# Patient Record
Sex: FEMALE | Race: WHITE | Employment: OTHER | URBAN - METROPOLITAN AREA
[De-identification: names, ages, dates, MRNs, and addresses within clinical notes are randomized per-mention and may not be internally consistent; named-entity substitution may affect disease eponyms.]

---

## 2017-03-23 ENCOUNTER — PROCEDURE VISIT (OUTPATIENT)
Dept: CARDIOLOGY CLINIC | Age: 57
End: 2017-03-23

## 2017-03-23 ENCOUNTER — OFFICE VISIT (OUTPATIENT)
Dept: CARDIOLOGY CLINIC | Age: 57
End: 2017-03-23

## 2017-03-23 ENCOUNTER — HOSPITAL ENCOUNTER (OUTPATIENT)
Dept: CARDIOLOGY | Facility: CLINIC | Age: 57
Discharge: HOME | End: 2017-03-23
Attending: INTERNAL MEDICINE | Admitting: INTERNAL MEDICINE

## 2017-03-23 VITALS
DIASTOLIC BLOOD PRESSURE: 68 MMHG | SYSTOLIC BLOOD PRESSURE: 138 MMHG | HEIGHT: 65 IN | WEIGHT: 197.4 LBS | HEART RATE: 70 BPM | BODY MASS INDEX: 32.89 KG/M2

## 2017-03-23 DIAGNOSIS — I42.9 CARDIOMYOPATHY, IDIOPATHIC (HCC): ICD-10-CM

## 2017-03-23 DIAGNOSIS — R06.02 SOB (SHORTNESS OF BREATH): ICD-10-CM

## 2017-03-23 DIAGNOSIS — Z95.810 AUTOMATIC IMPLANTABLE CARDIOVERTER-DEFIBRILLATOR IN SITU: ICD-10-CM

## 2017-03-23 DIAGNOSIS — I50.22 SYSTOLIC CHF, CHRONIC (HCC): ICD-10-CM

## 2017-03-23 DIAGNOSIS — Z95.810 AUTOMATIC IMPLANTABLE CARDIOVERTER-DEFIBRILLATOR IN SITU: Primary | ICD-10-CM

## 2017-03-23 DIAGNOSIS — I50.22 SYSTOLIC CHF, CHRONIC (HCC): Primary | ICD-10-CM

## 2017-03-23 DIAGNOSIS — Z95.810 CARDIAC DEFIBRILLATOR IN PLACE: ICD-10-CM

## 2017-03-23 LAB
LV EF: 50 %
LVEF MODALITY: NORMAL

## 2017-03-23 PROCEDURE — 99214 OFFICE O/P EST MOD 30 MIN: CPT | Performed by: INTERNAL MEDICINE

## 2017-03-23 PROCEDURE — 93290 INTERROG DEV EVAL ICPMS IP: CPT | Performed by: INTERNAL MEDICINE

## 2017-03-23 PROCEDURE — 93284 PRGRMG EVAL IMPLANTABLE DFB: CPT | Performed by: INTERNAL MEDICINE

## 2018-01-29 ENCOUNTER — TELEPHONE (OUTPATIENT)
Dept: CARDIOLOGY CLINIC | Age: 58
End: 2018-01-29

## 2018-03-23 PROCEDURE — 93284 PRGRMG EVAL IMPLANTABLE DFB: CPT | Performed by: INTERNAL MEDICINE

## 2018-03-27 ENCOUNTER — PROCEDURE VISIT (OUTPATIENT)
Dept: CARDIOLOGY CLINIC | Age: 58
End: 2018-03-27

## 2018-03-27 ENCOUNTER — OFFICE VISIT (OUTPATIENT)
Dept: CARDIOLOGY CLINIC | Age: 58
End: 2018-03-27

## 2018-03-27 VITALS
DIASTOLIC BLOOD PRESSURE: 80 MMHG | WEIGHT: 219 LBS | SYSTOLIC BLOOD PRESSURE: 170 MMHG | HEIGHT: 65 IN | OXYGEN SATURATION: 95 % | BODY MASS INDEX: 36.49 KG/M2 | HEART RATE: 96 BPM

## 2018-03-27 DIAGNOSIS — Z95.810 AUTOMATIC IMPLANTABLE CARDIOVERTER-DEFIBRILLATOR IN SITU: Primary | ICD-10-CM

## 2018-03-27 DIAGNOSIS — R06.02 SOB (SHORTNESS OF BREATH): ICD-10-CM

## 2018-03-27 DIAGNOSIS — Z95.810 AUTOMATIC IMPLANTABLE CARDIOVERTER-DEFIBRILLATOR IN SITU: ICD-10-CM

## 2018-03-27 DIAGNOSIS — Z95.810 CARDIAC DEFIBRILLATOR IN PLACE: ICD-10-CM

## 2018-03-27 DIAGNOSIS — I42.9 CARDIOMYOPATHY, IDIOPATHIC (HCC): ICD-10-CM

## 2018-03-27 DIAGNOSIS — I50.22 SYSTOLIC CHF, CHRONIC (HCC): Primary | ICD-10-CM

## 2018-03-27 DIAGNOSIS — E78.00 PURE HYPERCHOLESTEROLEMIA: ICD-10-CM

## 2018-03-27 DIAGNOSIS — E55.9 VITAMIN D DEFICIENCY: ICD-10-CM

## 2018-03-27 DIAGNOSIS — I10 ESSENTIAL HYPERTENSION: ICD-10-CM

## 2018-03-27 DIAGNOSIS — I50.22 SYSTOLIC CHF, CHRONIC (HCC): ICD-10-CM

## 2018-03-27 PROCEDURE — 99214 OFFICE O/P EST MOD 30 MIN: CPT | Performed by: INTERNAL MEDICINE

## 2018-03-27 PROCEDURE — 93290 INTERROG DEV EVAL ICPMS IP: CPT | Performed by: INTERNAL MEDICINE

## 2018-03-27 RX ORDER — PRAVASTATIN SODIUM 40 MG
40 TABLET ORAL DAILY
COMMUNITY
End: 2018-04-27

## 2018-03-27 RX ORDER — VALSARTAN 160 MG/1
160 TABLET ORAL DAILY
Qty: 30 TABLET | Refills: 3 | Status: SHIPPED | OUTPATIENT
Start: 2018-03-27 | End: 2018-06-06 | Stop reason: SDUPTHER

## 2018-03-27 NOTE — LETTER
  SECTION      x3    COLONOSCOPY      ENDOSCOPY, COLON, DIAGNOSTIC      KNEE SURGERY  10/2011    Left Maniscus    NECK SURGERY      FUSION C5/6 WITH CADAVAR BONES 2002    PACEMAKER INSERTION      medtronic BiV AICD, with optivol    TONSILLECTOMY       Family History   Problem Relation Age of Onset    Kidney Disease Mother     Stroke Father     High Blood Pressure Father     Heart Disease Father     High Blood Pressure Brother     Diabetes Brother      Social History     Social History    Marital status:      Spouse name: N/A    Number of children: N/A    Years of education: N/A     Occupational History    Not on file. Social History Main Topics    Smoking status: Former Smoker     Packs/day: 0.50     Years: 30.00     Quit date: 2010    Smokeless tobacco: Never Used    Alcohol use No    Drug use: No    Sexual activity: Not on file     Other Topics Concern    Not on file     Social History Narrative    No narrative on file       Review of Systems:   · Constitutional: there has been no unanticipated weight loss. There's been no change in energy level, sleep pattern, or activity level. · Eyes: No visual changes or diplopia. No scleral icterus. · ENT: No Headaches, hearing loss or vertigo. No mouth sores or sore throat. · Cardiovascular: No chest pain or palpitations but worsening edema. · Respiratory: SOL worsening   · Gastrointestinal: No abdominal pain, appetite loss, blood in stools. No change in bowel or bladder habits. · Genitourinary: No dysuria, trouble voiding, or hematuria. · Musculoskeletal:  No gait disturbance, weakness or joint complaints. · Integumentary: No rash or pruritis. · Neurological: No headache, diplopia, change in muscle strength, numbness or tingling. No change in gait, balance, coordination, mood, affect, memory, mentation, behavior. · Psychiatric: No anxiety, no depression. · Endocrine: No malaise, fatigue or temperature intolerance. No excessive thirst, fluid intake, or urination. No tremor. · Hematologic/Lymphatic: No abnormal bruising or bleeding, blood clots or swollen lymph nodes. · Allergic/Immunologic: No nasal congestion or hives. Physical Examination:    Vitals:    03/27/18 1502 03/27/18 1507   BP: (!) 170/90 (!) 170/80   Pulse: 96    SpO2: 95%    Weight: 219 lb (99.3 kg)    Height: 5' 5\" (1.651 m)      Wt Readings from Last 3 Encounters:   03/27/18 219 lb (99.3 kg)   03/23/17 197 lb 6.4 oz (89.5 kg)   06/07/16 184 lb (83.5 kg)     BP Readings from Last 3 Encounters:   03/27/18 (!) 170/80   03/23/17 138/68   06/07/16 138/70       Constitutional and General Appearance:   WD/WN in NAD, pale appearing  HEENT:  NC/AT  Respiratory:  · Normal excursion and expansion without use of accessory muscles  · Resp Auscultation: Normal breath sounds without dullness  Cardiovascular:  · The apical impulses not displaced  · Heart tones are crisp and normal  · Cervical veins are not engorged  · The carotid upstroke is normal in amplitude and contour without delay or bruit  · JVP 9-10 cm H2O  RRR with nl S1 and S2 without m,r,g  · Peripheral pulses are symmetrical and full  · There is no clubbing, cyanosis of the extremities. · Pitting 2+ edema bilaterally from knees down.     · Femoral Arteries: 2+ and equal  · Pedal Pulses: 2+ and equal   Abdomen:  · No masses or tenderness  · Liver/Spleen: No Abnormalities Noted  Neurological/Psychiatric:  · Alert and oriented in all spheres  · Moves all extremities well  · Exhibits normal gait balance and coordination  · No abnormalities of mood, affect, memory, mentation, or behavior are noted  ·   Lab Results   Component Value Date     12/03/2014     09/09/2011     09/08/2011    K 4.0 12/03/2014    K 4.1 09/09/2011    K 3.7 09/08/2011    BUN 13 12/03/2014    BUN 41 09/09/2011    BUN 26 09/08/2011    CREATININE 1.1 12/03/2014 CREATININE 1.5 09/09/2011    CREATININE 1.2 09/08/2011    GLUCOSE 96 12/03/2014    GLUCOSE 90 09/09/2011     Lab Results   Component Value Date    BNP 37 09/07/2011    BNP 39 05/31/2011    BNP 37 10/19/2010     Lab Results   Component Value Date    ALT 21 12/03/2014    ALT 14 09/07/2011    AST 19 12/03/2014    AST 18 09/07/2011     Lab Results   Component Value Date    HGB 14.5 12/03/2014    HGB 12.1 02/12/2014    HCT 43.2 12/03/2014    HCT 35.1 02/12/2014     12/03/2014    PLT see below 02/12/2014     Lab Results   Component Value Date    TRIG 119 12/03/2014    TRIG 111 05/31/2011    HDL 69 12/03/2014    HDL 59 05/31/2011    HDL 53 02/08/2010    LDLCALC 84 12/03/2014    LDLCALC 146 05/31/2011      ECHO 3/23/17  Summary   Low normal left ventricle systolic function with an estimated ejection   fraction of 50%. EF by Cornejo's method is 51%. There is hypokinesis of the inferoseptal and apical inferior walls. Grade I diastolic dysfunction with normal filing pressure. Mild mitral regurgitation. Mild to moderate aortic regurgitation. Systolic pulmonary artery pressure (SPAP) is normal and estimated at 20 mmHg   (RA pressure 3 mmHg). Assessment/Plan:  Doing well from a CV standpoint   We made no change in her Tx   1. HYPERTENSION - controlled   BP (!) 170/80   Pulse 96   Ht 5' 5\" (1.651 m)   Wt 219 lb (99.3 kg)   SpO2 95%   BMI 36.44 kg/m²       2. Hyperlipidemia - will order lipids for evaluation  11/14 TC= 177; TG= 119;HDL= 69; LDL= 84   3. Cardiomyopathy 1/14 Summary  Low normal LV systolic function with EF of 50-55%  Abnormal (paradoxical) septal motion consistent with RV pacemaker  Reversal of E/A inflow velocities across the mitral valve suggesting  impaired left ventricular relaxation. Mild mitral and tricuspid regurgitation with RVSP estimated at 30 mmHg. Moderate aortic regurgitation is present. Pacer lead noted in right heart. 6/09: EF 25-30%, echo 11/10: EF 55%, 6/09 medtronic bi-v AICD, EF 50% from ECHO    4. CHF (congestive heart failure) - compensated today    5. Palpitations - no complaints today      PLAN  1. LABS - cbc, cmp, bnp, tsh, Ft4, Vit D, lipid  2. Echocardiogram  3. Chest xray  4. Valsartan 160 mg daily due to CHF and hypertension  5. Follow up in 4-6 weeks  6. Testing will determine referral to pulmonology      QUALITY MEASURES  1. Tobacco Cessation Counseling:  N/A (quit 2010)  2. Retake of BP if >140/90:   N/A  3. CAD patient on anti-platelet: N/A    4. CAD patient on STATIN therapy:  N/A (lipitor for HLD)  5. Patient with CHF and aFib on anticoagulation:  N/A       I appreciate the opportunity of cooperating in the care of this individual.    Annabella Krishnan M.D., [facility] [place]                            If you have questions, please do not hesitate to call me. I look forward to following Bonifacio Huerta along with you.     Sincerely,        Aure Cool MD

## 2018-03-27 NOTE — PROGRESS NOTES
[address]   Advanced Heart Failure/Pulmonary Hypertension  Cardiac Evaluation      Lyla Askew  YOB: 1960    Date of Visit:  3/27/18        Chief Complaint   Patient presents with    1 Year Follow Up    Cough    Shortness of Breath    Sweats     profuse        History of Present Illness:  Ms Christa Yen comes to the office today for follow up of her nonischemic cardiomyopathy with LBBB. She had a RHC 1/7/09 which showed normal coronaries and normal cardiac output. She was told years ago that her EF was 20-25%. 8/08 she had an angiogram that showed normal coronaries and EF 30%. However, MUGA scan revealed EF 61%. June, 2009 she had placement of bi-v defibrillator, and has had no shocks. Echo 10/09 showed EF 40-45% and decrease in left ventricular size. She had an echo today that showed EF 50%. She has been granted disability for her heart and has been out of work. As part of her history was told she has a slight underactive thyroid. Her echo from 3/23/17 shows an EF of 50-55%. Her device check from 3/23/17 shows normal device sensing and function with normal fluid level on her Optivol. Today she reports she has an ongoing dry cough. This started at the end of last summer. She states that her voice gets hoarse. She feels like a tight band is around her chest.  She states she has been very SOB. This happens with exertion. She breaks out in a sweat. She is not taking the lisinopril. She is taking all her other medications as prescribed. She is taking the lasix 3-4 times a week, but it does not help with SOB. She lost her mother to lung cancer in September 2017. She denies chest pain, dizziness or syncope. She has tried taking Prilosec and this has not improved.         Allergies   Allergen Reactions    Tetracyclines & Related Nausea And Vomiting    Lisinopril      Flu like symptoms     Current Outpatient Prescriptions   Medication Sig Dispense Refill    pravastatin (PRAVACHOL) 40 MG tablet Take 40 mg by mouth daily      furosemide (LASIX) 20 MG tablet Take 1 tablet by mouth as needed 90 tablet 3    carvedilol (COREG) 25 MG tablet Take 1 tablet by mouth 2 times daily (with meals). 180 tablet 4    levothyroxine (SYNTHROID) 25 MCG tablet Take 25 mcg by mouth Daily.  atorvastatin (LIPITOR) 20 MG tablet TAKE ONE TABLET BY MOUTH DAILY 30 tablet 5    lisinopril (PRINIVIL;ZESTRIL) 20 MG tablet Take 1 tablet by mouth daily 90 tablet 3     No current facility-administered medications for this visit.         Immunization History   Administered Date(s) Administered    Pneumococcal Polysaccharide ([id number]) 2011       Patient Active Problem List   Diagnosis    Hypertension    Hyperlipidemia    Palpitations    Systolic CHF, chronic ([address]    Cardiomyopathy, idiopathic (HCC)    Automatic implantable cardioverter-defibrillator in situ    SOB (shortness of breath)       Past Medical History:   Diagnosis Date    BIPOLAR     Cardiomyopathy ([address]     UNKNOWN CAUSE    CHF (congestive heart failure) (HCC)     Fibromyalgia     GERD (gastroesophageal reflux disease)     Hyperlipidemia     Hypertension     Palpitations     Plantar fasciitis left lower extremity    TORN MENISCUS BOTH KNEES      Past Surgical History:   Procedure Laterality Date    BREAST ENHANCEMENT SURGERY       SECTION      x3    COLONOSCOPY      ENDOSCOPY, COLON, DIAGNOSTIC      KNEE SURGERY  10/2011    Left Maniscus    NECK SURGERY      FUSION C5/6 WITH CADAVAR BONES     PACEMAKER INSERTION      medtronic BiV AICD, with optivol    TONSILLECTOMY       Family History   Problem Relation Age of Onset    Kidney Disease Mother     Stroke Father     High Blood Pressure Father     Heart Disease Father     High Blood Pressure Brother     Diabetes Brother      Social History     Social History    Marital status:      Spouse name: N/A    Number of children: N/A    Years of [address] 12/03/2014    LDLCALC 146 05/31/2011      ECHO 3/23/17  Summary   Low normal left ventricle systolic function with an estimated ejection   fraction of 50%. EF by Cornejo's method is 51%. There is hypokinesis of the inferoseptal and apical inferior walls. Grade I diastolic dysfunction with normal filing pressure. Mild mitral regurgitation. Mild to moderate aortic regurgitation. Systolic pulmonary artery pressure (SPAP) is normal and estimated at 20 mmHg   (RA pressure 3 mmHg). Assessment/Plan:  Doing well from a CV standpoint   We made no change in her Tx   1. HYPERTENSION - controlled   BP (!) 170/80   Pulse 96   Ht 5' 5\" (1.651 m)   Wt 219 lb (99.3 kg)   SpO2 95%   BMI 36.44 kg/m²      2. Hyperlipidemia - will order lipids for evaluation  11/14 TC= 177; TG= 119;HDL= 69; LDL= 84   3. Cardiomyopathy 1/14 Summary  Low normal LV systolic function with EF of 50-55%  Abnormal (paradoxical) septal motion consistent with RV pacemaker  Reversal of E/A inflow velocities across the mitral valve suggesting  impaired left ventricular relaxation. Mild mitral and tricuspid regurgitation with RVSP estimated at 30 mmHg. Moderate aortic regurgitation is present. Pacer lead noted in right heart. 6/09: EF 25-30%, echo 11/10: EF 55%, 6/09 medtronic bi-v AICD, EF 50% from ECHO    4. CHF (congestive heart failure) - compensated today    5. Palpitations - no complaints today      PLAN  1. LABS - cbc, cmp, bnp, tsh, Ft4, Vit D, lipid  2. Echocardiogram  3. Chest xray  4. Valsartan 160 mg daily due to CHF and hypertension  5. Follow up in 4-6 weeks  6. Testing will determine referral to pulmonology      QUALITY MEASURES  1. Tobacco Cessation Counseling:  N/A (quit 2010)  2. Retake of BP if >140/90:   N/A  3. CAD patient on anti-platelet: N/A    4. CAD patient on STATIN therapy:  N/A (lipitor for HLD)  5.  Patient with CHF and aFib on anticoagulation:  N/A       I appreciate the opportunity of cooperating in the care of this individual.    Beckie Morales M.D., [facility]

## 2018-03-28 ENCOUNTER — HOSPITAL ENCOUNTER (OUTPATIENT)
Dept: OTHER | Age: 58
Discharge: HOME | End: 2018-03-28
Attending: INTERNAL MEDICINE | Admitting: INTERNAL MEDICINE

## 2018-03-28 ENCOUNTER — TELEPHONE (OUTPATIENT)
Dept: CARDIOLOGY CLINIC | Age: 58
End: 2018-03-28

## 2018-03-28 DIAGNOSIS — R06.02 SOB (SHORTNESS OF BREATH): ICD-10-CM

## 2018-03-28 LAB
A/G RATIO: 1.4 (ref 1.1–2.2)
ALBUMIN SERPL-MCNC: 4.1 G/DL (ref 3.4–5)
ALP BLD-CCNC: 72 U/L (ref 40–129)
ALT SERPL-CCNC: 36 U/L (ref 10–40)
ANION GAP SERPL CALCULATED.3IONS-SCNC: 11 MMOL/L (ref 3–16)
AST SERPL-CCNC: 32 U/L (ref 15–37)
BASOPHILS ABSOLUTE: 0 K/UL (ref 0–0.2)
BASOPHILS RELATIVE PERCENT: 0.4 %
BILIRUB SERPL-MCNC: 0.3 MG/DL (ref 0–1)
BUN BLDV-MCNC: 14 MG/DL (ref 7–20)
CALCIUM SERPL-MCNC: 9.5 MG/DL (ref 8.3–10.6)
CHLORIDE BLD-SCNC: 105 MMOL/L (ref 99–110)
CHOLESTEROL, FASTING: 189 MG/DL (ref 0–199)
CO2: 25 MMOL/L (ref 21–32)
CREAT SERPL-MCNC: 1 MG/DL (ref 0.6–1.1)
EOSINOPHILS ABSOLUTE: 0.2 K/UL (ref 0–0.6)
EOSINOPHILS RELATIVE PERCENT: 1.7 %
GFR AFRICAN AMERICAN: >60
GFR NON-AFRICAN AMERICAN: 57
GLOBULIN: 3 G/DL
GLUCOSE FASTING: 101 MG/DL (ref 70–99)
HCT VFR BLD CALC: 43.3 % (ref 36–48)
HDLC SERPL-MCNC: 53 MG/DL (ref 40–60)
HEMOGLOBIN: 14.6 G/DL (ref 12–16)
LDL CHOLESTEROL CALCULATED: 106 MG/DL
LYMPHOCYTES ABSOLUTE: 3.3 K/UL (ref 1–5.1)
LYMPHOCYTES RELATIVE PERCENT: 35.1 %
MCH RBC QN AUTO: 31.7 PG (ref 26–34)
MCHC RBC AUTO-ENTMCNC: 33.8 G/DL (ref 31–36)
MCV RBC AUTO: 93.8 FL (ref 80–100)
MONOCYTES ABSOLUTE: 0.8 K/UL (ref 0–1.3)
MONOCYTES RELATIVE PERCENT: 8.3 %
NEUTROPHILS ABSOLUTE: 5.1 K/UL (ref 1.7–7.7)
NEUTROPHILS RELATIVE PERCENT: 54.5 %
PDW BLD-RTO: 13.6 % (ref 12.4–15.4)
PLATELET # BLD: 241 K/UL (ref 135–450)
PMV BLD AUTO: 7.8 FL (ref 5–10.5)
POTASSIUM SERPL-SCNC: 4 MMOL/L (ref 3.5–5.1)
PRO-BNP: 91 PG/ML (ref 0–124)
RBC # BLD: 4.62 M/UL (ref 4–5.2)
SODIUM BLD-SCNC: 141 MMOL/L (ref 136–145)
T4 FREE: 1.2 NG/DL (ref 0.9–1.8)
TOTAL PROTEIN: 7.1 G/DL (ref 6.4–8.2)
TRIGLYCERIDE, FASTING: 150 MG/DL (ref 0–150)
TSH REFLEX: 6.35 UIU/ML (ref 0.27–4.2)
VLDLC SERPL CALC-MCNC: 30 MG/DL
WBC # BLD: 9.3 K/UL (ref 4–11)

## 2018-03-28 NOTE — TELEPHONE ENCOUNTER
----- Message from Rafal Renner MD sent at 3/28/2018  3:40 PM EDT -----  Please call patient and let her know that her labs look good. No change in treatment. Rafal Renner.

## 2018-03-29 ENCOUNTER — TELEPHONE (OUTPATIENT)
Dept: CARDIOLOGY CLINIC | Age: 58
End: 2018-03-29

## 2018-03-29 LAB — VITAMIN D 25-HYDROXY: 10.6 NG/ML

## 2018-03-29 RX ORDER — ERGOCALCIFEROL (VITAMIN D2) 1250 MCG
50000 CAPSULE ORAL WEEKLY
Qty: 4 CAPSULE | Refills: 5 | Status: SHIPPED | OUTPATIENT
Start: 2018-03-29 | End: 2018-09-10 | Stop reason: SDUPTHER

## 2018-03-29 NOTE — TELEPHONE ENCOUNTER
----- Message from Zoë Godfrey MD sent at 3/29/2018  4:31 PM EDT -----  Call pt. Labs show severely low vitamin D level. Start ergocalciferol 50,000 units weekly x 6 months.   MATEO

## 2018-04-05 NOTE — COMMUNICATION BODY
(PRAVACHOL) 40 MG tablet Take 40 mg by mouth daily      furosemide (LASIX) 20 MG tablet Take 1 tablet by mouth as needed 90 tablet 3    carvedilol (COREG) 25 MG tablet Take 1 tablet by mouth 2 times daily (with meals). 180 tablet 4    levothyroxine (SYNTHROID) 25 MCG tablet Take 25 mcg by mouth Daily.  atorvastatin (LIPITOR) 20 MG tablet TAKE ONE TABLET BY MOUTH DAILY 30 tablet 5    lisinopril (PRINIVIL;ZESTRIL) 20 MG tablet Take 1 tablet by mouth daily 90 tablet 3     No current facility-administered medications for this visit.         Immunization History   Administered Date(s) Administered    Pneumococcal Polysaccharide ([id number]) 2011       Patient Active Problem List   Diagnosis    Hypertension    Hyperlipidemia    Palpitations    Systolic CHF, chronic ([address]    Cardiomyopathy, idiopathic (HCC)    Automatic implantable cardioverter-defibrillator in situ    SOB (shortness of breath)       Past Medical History:   Diagnosis Date    BIPOLAR     Cardiomy[facility] ([facility]     UNKNOWN CAUSE    CHF (congestive heart failure) (HCC)     Fibromyalgia     GERD (gastroesophageal reflux disease)     Hyperlipidemia     Hypertension     Palpitations     Plantar fasciitis left lower extremity    TORN MENISCUS BOTH KNEES      Past Surgical History:   Procedure Laterality Date    BREAST ENHANCEMENT SURGERY       SECTION      x3    COLONOSCOPY      ENDOSCOPY, COLON, DIAGNOSTIC      KNEE SURGERY  10/2011    Left Maniscus    NECK SURGERY      FUSION C5/6 WITH CADAVAR BONES     PACEMAKER INSERTION      medtronic BiV AICD, with optivol    TONSILLECTOMY       Family History   Problem Relation Age of Onset    Kidney Disease Mother     Stroke Father     High Blood Pressure Father     Heart Disease Father     High Blood Pressure Brother     Diabetes Brother      Social History     Social History    Marital status:      Spouse name: N/A    Number of children: N/A    Years of [address] 84 12/03/2014    LDLCALC 146 05/31/2011      ECHO 3/23/17  Summary   Low normal left ventricle systolic function with an estimated ejection   fraction of 50%. EF by Cornejo's method is 51%. There is hypokinesis of the inferoseptal and apical inferior walls. Grade I diastolic dysfunction with normal filing pressure. Mild mitral regurgitation. Mild to moderate aortic regurgitation. Systolic pulmonary artery pressure (SPAP) is normal and estimated at 20 mmHg   (RA pressure 3 mmHg). Assessment/Plan:  Doing well from a CV standpoint   We made no change in her Tx   1. HYPERTENSION - controlled   BP (!) 170/80   Pulse 96   Ht 5' 5\" (1.651 m)   Wt 219 lb (99.3 kg)   SpO2 95%   BMI 36.44 kg/m²       2. Hyperlipidemia - will order lipids for evaluation  11/14 TC= 177; TG= 119;HDL= 69; LDL= 84   3. Cardiomyopathy 1/14 Summary  Low normal LV systolic function with EF of 50-55%  Abnormal (paradoxical) septal motion consistent with RV pacemaker  Reversal of E/A inflow velocities across the mitral valve suggesting  impaired left ventricular relaxation. Mild mitral and tricuspid regurgitation with RVSP estimated at 30 mmHg. Moderate aortic regurgitation is present. Pacer lead noted in right heart. 6/09: EF 25-30%, echo 11/10: EF 55%, 6/09 medtronic bi-v AICD, EF 50% from ECHO    4. CHF (congestive heart failure) - compensated today    5. Palpitations - no complaints today      PLAN  1. LABS - cbc, cmp, bnp, tsh, Ft4, Vit D, lipid  2. Echocardiogram  3. Chest xray  4. Valsartan 160 mg daily due to CHF and hypertension  5. Follow up in 4-6 weeks  6. Testing will determine referral to pulmonology      QUALITY MEASURES  1. Tobacco Cessation Counseling:  N/A (quit 2010)  2. Retake of BP if >140/90:   N/A  3. CAD patient on anti-platelet: N/A    4. CAD patient on STATIN therapy:  N/A (lipitor for HLD)  5.  Patient with CHF and aFib on anticoagulation:  N/A       I appreciate the opportunity

## 2018-04-13 ENCOUNTER — TELEPHONE (OUTPATIENT)
Dept: CARDIOLOGY CLINIC | Age: 58
End: 2018-04-13

## 2018-04-18 ENCOUNTER — HOSPITAL ENCOUNTER (OUTPATIENT)
Dept: CARDIOLOGY | Facility: CLINIC | Age: 58
Discharge: HOME | End: 2018-04-18
Attending: INTERNAL MEDICINE | Admitting: INTERNAL MEDICINE

## 2018-04-18 DIAGNOSIS — I50.22 CHRONIC SYSTOLIC CONGESTIVE HEART FAILURE (HCC): ICD-10-CM

## 2018-04-18 LAB
LV EF: 55 %
LVEF MODALITY: NORMAL

## 2018-04-26 ENCOUNTER — TELEPHONE (OUTPATIENT)
Dept: CARDIOLOGY CLINIC | Age: 58
End: 2018-04-26

## 2018-04-27 ENCOUNTER — OFFICE VISIT (OUTPATIENT)
Dept: CARDIOLOGY CLINIC | Age: 58
End: 2018-04-27

## 2018-04-27 VITALS
HEART RATE: 78 BPM | BODY MASS INDEX: 37.39 KG/M2 | SYSTOLIC BLOOD PRESSURE: 130 MMHG | DIASTOLIC BLOOD PRESSURE: 72 MMHG | OXYGEN SATURATION: 97 % | WEIGHT: 219 LBS | HEIGHT: 64 IN

## 2018-04-27 DIAGNOSIS — R06.02 SOB (SHORTNESS OF BREATH): ICD-10-CM

## 2018-04-27 DIAGNOSIS — E78.00 PURE HYPERCHOLESTEROLEMIA: ICD-10-CM

## 2018-04-27 DIAGNOSIS — I50.22 SYSTOLIC CHF, CHRONIC (HCC): Primary | ICD-10-CM

## 2018-04-27 DIAGNOSIS — E55.9 VITAMIN D INSUFFICIENCY: ICD-10-CM

## 2018-04-27 DIAGNOSIS — I10 ESSENTIAL HYPERTENSION: ICD-10-CM

## 2018-04-27 PROCEDURE — 99214 OFFICE O/P EST MOD 30 MIN: CPT | Performed by: INTERNAL MEDICINE

## 2018-04-27 RX ORDER — ATORVASTATIN CALCIUM 20 MG/1
TABLET, FILM COATED ORAL
Qty: 30 TABLET | Refills: 5 | Status: SHIPPED | OUTPATIENT
Start: 2018-04-27 | End: 2019-01-22 | Stop reason: SDUPTHER

## 2018-04-27 RX ORDER — CARVEDILOL 25 MG/1
25 TABLET ORAL 2 TIMES DAILY WITH MEALS
Qty: 180 TABLET | Refills: 4 | Status: SHIPPED | OUTPATIENT
Start: 2018-04-27 | End: 2020-11-24 | Stop reason: SDUPTHER

## 2018-06-06 RX ORDER — VALSARTAN 160 MG/1
TABLET ORAL
Qty: 90 TABLET | Refills: 3 | Status: SHIPPED | OUTPATIENT
Start: 2018-06-06 | End: 2019-10-22

## 2018-06-14 ENCOUNTER — OFFICE VISIT (OUTPATIENT)
Dept: PULMONOLOGY | Age: 58
End: 2018-06-14

## 2018-06-14 VITALS
HEART RATE: 84 BPM | DIASTOLIC BLOOD PRESSURE: 76 MMHG | BODY MASS INDEX: 37.9 KG/M2 | OXYGEN SATURATION: 98 % | HEIGHT: 64 IN | RESPIRATION RATE: 16 BRPM | WEIGHT: 222 LBS | TEMPERATURE: 98.4 F | SYSTOLIC BLOOD PRESSURE: 161 MMHG

## 2018-06-14 DIAGNOSIS — R05.9 COUGH: ICD-10-CM

## 2018-06-14 DIAGNOSIS — Z87.891 PERSONAL HISTORY OF TOBACCO USE: ICD-10-CM

## 2018-06-14 DIAGNOSIS — R06.02 SHORTNESS OF BREATH: ICD-10-CM

## 2018-06-14 PROCEDURE — 99204 OFFICE O/P NEW MOD 45 MIN: CPT | Performed by: INTERNAL MEDICINE

## 2018-06-14 PROCEDURE — G0296 VISIT TO DETERM LDCT ELIG: HCPCS | Performed by: INTERNAL MEDICINE

## 2018-06-14 RX ORDER — FLUTICASONE FUROATE AND VILANTEROL 200; 25 UG/1; UG/1
POWDER RESPIRATORY (INHALATION)
Qty: 2 EACH | Refills: 0 | COMMUNITY
Start: 2018-06-14 | End: 2018-06-28

## 2018-06-14 RX ORDER — ALBUTEROL SULFATE 90 UG/1
2 AEROSOL, METERED RESPIRATORY (INHALATION) EVERY 4 HOURS PRN
Qty: 1 INHALER | Refills: 5 | Status: SHIPPED | OUTPATIENT
Start: 2018-06-14 | End: 2021-08-25 | Stop reason: SDUPTHER

## 2018-06-26 ENCOUNTER — HOSPITAL ENCOUNTER (OUTPATIENT)
Dept: CT IMAGING | Age: 58
Discharge: HOME | End: 2018-06-26
Attending: INTERNAL MEDICINE | Admitting: INTERNAL MEDICINE

## 2018-06-26 DIAGNOSIS — Z87.891 PERSONAL HISTORY OF TOBACCO USE: ICD-10-CM

## 2018-07-02 ENCOUNTER — TELEPHONE (OUTPATIENT)
Dept: CASE MANAGEMENT | Age: 58
End: 2018-07-02

## 2018-07-18 ENCOUNTER — OFFICE VISIT (OUTPATIENT)
Dept: PULMONOLOGY | Age: 58
End: 2018-07-18

## 2018-07-18 VITALS
OXYGEN SATURATION: 97 % | BODY MASS INDEX: 37.39 KG/M2 | DIASTOLIC BLOOD PRESSURE: 84 MMHG | HEART RATE: 77 BPM | HEIGHT: 64 IN | TEMPERATURE: 98 F | WEIGHT: 219 LBS | RESPIRATION RATE: 18 BRPM | SYSTOLIC BLOOD PRESSURE: 142 MMHG

## 2018-07-18 DIAGNOSIS — J45.909 UNCOMPLICATED ASTHMA, UNSPECIFIED ASTHMA SEVERITY, UNSPECIFIED WHETHER PERSISTENT: ICD-10-CM

## 2018-07-18 DIAGNOSIS — R91.1 PULMONARY NODULE: Primary | ICD-10-CM

## 2018-07-18 PROCEDURE — 99214 OFFICE O/P EST MOD 30 MIN: CPT | Performed by: INTERNAL MEDICINE

## 2018-07-18 RX ORDER — FLUTICASONE FUROATE AND VILANTEROL 200; 25 UG/1; UG/1
POWDER RESPIRATORY (INHALATION)
COMMUNITY
End: 2018-07-18 | Stop reason: SDUPTHER

## 2018-07-18 RX ORDER — FLUTICASONE FUROATE AND VILANTEROL 200; 25 UG/1; UG/1
1 POWDER RESPIRATORY (INHALATION) DAILY
Qty: 3 EACH | Refills: 3 | Status: SHIPPED | OUTPATIENT
Start: 2018-07-18 | End: 2019-07-15 | Stop reason: SDUPTHER

## 2018-07-18 RX ORDER — FLUTICASONE FUROATE AND VILANTEROL 100; 25 UG/1; UG/1
POWDER RESPIRATORY (INHALATION) DAILY
COMMUNITY
End: 2018-07-18

## 2018-07-18 NOTE — Clinical Note
Simon Pink is doing dramatically better since starting asthma treatment. My plan is to see her back with a PFT in a few months to see if there is significant underlying COPD, but my clinical impression is that this is mostly related to asthma. She is participating in lung cancer screening and her next scan was set up for July 2019.   Thanks, Matt Trinidad

## 2018-07-18 NOTE — PROGRESS NOTES
PULMONARY, CRITICAL CARE AND SLEEP MEDICINE     CC/REFERRING PROVIDER:  Patient is being seen at the request of Dr. Kieran Shi for a consultation for shortness of breath      Interval History:     Had LDCT for LCS, here for results  Started breo and it is like a miracle her breathing and cough are so much better. She does still use neb prior to going to bed. PRESENTING HPI: 62 yo WF with non ischemic cardiomyopathy, over the winter 2018 debilitating shortness of breath and cough, lost voice. Over the last few weeks, with central air much less coughing and breathing is better. Over the last few days, coughing more at night. No family history of asthma, but did have asthma as a child. Of note, she cannot take a single big breath without coughing, she cannot exercise at all without shortness of breath and cough. reports that she quit smoking about 3 years ago. She has a 32.67 pack-year smoking history. She has never used smokeless tobacco.  Just less than 1 ppd X 35 years       PHYSICAL EXAM:  Blood pressure (!) 142/84, pulse 77, temperature 98 °F (36.7 °C), temperature source Oral, resp. rate 18, height 5' 4\" (1.626 m), weight 219 lb (99.3 kg), SpO2 97 %.'  Constitutional:  No acute distress. HENT:  Oropharynx is clear and moist.   Neck: No tracheal deviation present. Cardiovascular: Normal heart sounds. No lower extremity edema. Pulmonary/Chest: No wheezes. No rhonchi. No rales. No decreased breath sounds. No accessory muscle usage or stridor. Musculoskeletal: No cyanosis. No clubbing. Skin: Skin is warm and dry. Psychiatric: Normal mood and affect. Neurologic: speech fluent, alert and oriented, strength symmetric      DATA:  LDCT 6/26/18  FINDINGS:   Mediastinum: Coronary artery calcifications are a marker of atherosclerosis. A 3 lead ICD is in situ.  There are no enlarged thoracic lymph nodes.       Lungs/pleura:  The tracheobronchial tree is patent. Brianne Kinnier is no pneumothorax   or pleural effusion. Castro Favia is bibasilar scarring and/or atelectasis.       There are multiple solid subcentimeter bilateral pulmonary nodules.  The   largest measures 5 mm in the right upper lobe along the minor fissure.       Upper Abdomen: Scattered diverticula involve the transverse colon without   adjacent inflammation.       Soft Tissues/Bones: Degenerative changes involve the thoracic spine.  Status   post bilateral mammoplasty augmentation.           Impression   1. Multiple solid subcentimeter bilateral pulmonary nodules.       RECOMMENDATION:   Per ACR Lung-RADS Version 1.0       Category 2, Benign appearance or behavior.  Management:  Continue annual lung   screening with LDCT in 12 months. (probability of malignancy <1%).      ASSESSMENT:  · Moderate persistent asthma  · Pulmonary Nodules   · Non ischemic cardiomyopathy, f/b Dr. Lorena Garsia, improved on f/u ECHO  · Biventricular pacemaker  · 31 pack year tobacco, quit in 2008    PLAN:  · LDCT for f/u Pulmonary Nodule in 12 months  · Breo 200/25, albuterol PRN for asthma  · Full PFT and see me in 3-4 months

## 2018-07-18 NOTE — PATIENT INSTRUCTIONS
Please keep all of your future appointments scheduled by [facility]. PFT PREP  Nothing my mouth 1 hour prior to test (water only is OK). No smoking or inhalers 4 hours prior to test unless directed differently by the physician. Please PRE-REGISTER at [phone] option 2, option 2,prior to your test date. Also, please remember to arrive 20 to 30 minutes prior to testing unless otherwise instructed.

## 2018-08-13 ENCOUNTER — TELEPHONE (OUTPATIENT)
Dept: CARDIOLOGY CLINIC | Age: 58
End: 2018-08-13

## 2018-08-13 RX ORDER — IRBESARTAN 150 MG/1
150 TABLET ORAL NIGHTLY
Qty: 90 TABLET | Refills: 3 | Status: SHIPPED | OUTPATIENT
Start: 2018-08-13 | End: 2018-10-23 | Stop reason: SDUPTHER

## 2018-09-03 ENCOUNTER — APPOINTMENT (OUTPATIENT)
Dept: CT IMAGING | Age: 58
End: 2018-09-03
Payer: MEDICARE

## 2018-09-03 ENCOUNTER — HOSPITAL ENCOUNTER (EMERGENCY)
Age: 58
Discharge: HOME | End: 2018-09-03
Attending: EMERGENCY MEDICINE
Payer: MEDICARE

## 2018-09-03 VITALS
BODY MASS INDEX: 34.15 KG/M2 | OXYGEN SATURATION: 99 % | RESPIRATION RATE: 20 BRPM | DIASTOLIC BLOOD PRESSURE: 88 MMHG | TEMPERATURE: 98 F | HEIGHT: 64 IN | HEART RATE: 64 BPM | SYSTOLIC BLOOD PRESSURE: 162 MMHG | WEIGHT: 200 LBS

## 2018-09-03 DIAGNOSIS — R10.9 RIGHT FLANK PAIN: Primary | ICD-10-CM

## 2018-09-03 LAB
A/G RATIO: 1.5 (ref 1.1–2.2)
ALBUMIN SERPL-MCNC: 4.4 G/DL (ref 3.4–5)
ALP BLD-CCNC: 71 U/L (ref 40–129)
ALT SERPL-CCNC: 19 U/L (ref 10–40)
ANION GAP SERPL CALCULATED.3IONS-SCNC: 14 MMOL/L (ref 3–16)
AST SERPL-CCNC: 18 U/L (ref 15–37)
BASOPHILS ABSOLUTE: 0.1 K/UL (ref 0–0.2)
BASOPHILS RELATIVE PERCENT: 0.9 %
BILIRUB SERPL-MCNC: 0.3 MG/DL (ref 0–1)
BILIRUBIN URINE: NEGATIVE
BLOOD, URINE: NEGATIVE
BUN BLDV-MCNC: 8 MG/DL (ref 7–20)
CALCIUM SERPL-MCNC: 9.7 MG/DL (ref 8.3–10.6)
CHLORIDE BLD-SCNC: 105 MMOL/L (ref 99–110)
CLARITY: CLEAR
CO2: 24 MMOL/L (ref 21–32)
COLOR: ABNORMAL
CREAT SERPL-MCNC: 0.8 MG/DL (ref 0.6–1.1)
EOSINOPHILS ABSOLUTE: 0.1 K/UL (ref 0–0.6)
EOSINOPHILS RELATIVE PERCENT: 1.3 %
GFR AFRICAN AMERICAN: >60
GFR NON-AFRICAN AMERICAN: >60
GLOBULIN: 3 G/DL
GLUCOSE BLD-MCNC: 100 MG/DL (ref 70–99)
GLUCOSE URINE: NEGATIVE MG/DL
HCG QUALITATIVE: NEGATIVE
HCT VFR BLD CALC: 41.8 % (ref 36–48)
HEMOGLOBIN: 14.5 G/DL (ref 12–16)
KETONES, URINE: 15 MG/DL
LEUKOCYTE ESTERASE, URINE: NEGATIVE
LIPASE: 29 U/L (ref 13–60)
LYMPHOCYTES ABSOLUTE: 3.8 K/UL (ref 1–5.1)
LYMPHOCYTES RELATIVE PERCENT: 41.8 %
MCH RBC QN AUTO: 32 PG (ref 26–34)
MCHC RBC AUTO-ENTMCNC: 34.8 G/DL (ref 31–36)
MCV RBC AUTO: 92.2 FL (ref 80–100)
MICROSCOPIC EXAMINATION: ABNORMAL
MONOCYTES ABSOLUTE: 0.7 K/UL (ref 0–1.3)
MONOCYTES RELATIVE PERCENT: 7.1 %
NEUTROPHILS ABSOLUTE: 4.5 K/UL (ref 1.7–7.7)
NEUTROPHILS RELATIVE PERCENT: 48.9 %
NITRITE, URINE: NEGATIVE
PDW BLD-RTO: 14 % (ref 12.4–15.4)
PH UA: 5.5
PLATELET # BLD: 239 K/UL (ref 135–450)
PMV BLD AUTO: 8.5 FL (ref 5–10.5)
POTASSIUM SERPL-SCNC: 3.8 MMOL/L (ref 3.5–5.1)
PROTEIN UA: NEGATIVE MG/DL
RBC # BLD: 4.54 M/UL (ref 4–5.2)
SODIUM BLD-SCNC: 143 MMOL/L (ref 136–145)
SPECIFIC GRAVITY UA: 1.01
TOTAL PROTEIN: 7.4 G/DL (ref 6.4–8.2)
URINE TYPE: ABNORMAL
UROBILINOGEN, URINE: 0.2 E.U./DL
WBC # BLD: 9.2 K/UL (ref 4–11)

## 2018-09-03 PROCEDURE — 80053 COMPREHEN METABOLIC PANEL: CPT

## 2018-09-03 PROCEDURE — 2580000003 HC RX 258: Performed by: EMERGENCY MEDICINE

## 2018-09-03 PROCEDURE — 81003 URINALYSIS AUTO W/O SCOPE: CPT

## 2018-09-03 PROCEDURE — 96375 TX/PRO/DX INJ NEW DRUG ADDON: CPT

## 2018-09-03 PROCEDURE — 96361 HYDRATE IV INFUSION ADD-ON: CPT

## 2018-09-03 PROCEDURE — 96374 THER/PROPH/DIAG INJ IV PUSH: CPT

## 2018-09-03 PROCEDURE — 84703 CHORIONIC GONADOTROPIN ASSAY: CPT

## 2018-09-03 PROCEDURE — 85025 COMPLETE CBC W/AUTO DIFF WBC: CPT

## 2018-09-03 PROCEDURE — 74176 CT ABD & PELVIS W/O CONTRAST: CPT

## 2018-09-03 PROCEDURE — 99284 EMERGENCY DEPT VISIT MOD MDM: CPT

## 2018-09-03 PROCEDURE — 6360000002 HC RX W HCPCS: Performed by: EMERGENCY MEDICINE

## 2018-09-03 PROCEDURE — 83690 ASSAY OF LIPASE: CPT

## 2018-09-03 RX ORDER — ONDANSETRON 2 MG/ML
4 INJECTION INTRAMUSCULAR; INTRAVENOUS ONCE
Status: COMPLETED | OUTPATIENT
Start: 2018-09-03 | End: 2018-09-03

## 2018-09-03 RX ORDER — 0.9 % SODIUM CHLORIDE 0.9 %
1000 INTRAVENOUS SOLUTION INTRAVENOUS ONCE
Status: COMPLETED | OUTPATIENT
Start: 2018-09-03 | End: 2018-09-03

## 2018-09-03 RX ORDER — MORPHINE SULFATE 4 MG/ML
4 INJECTION, SOLUTION INTRAMUSCULAR; INTRAVENOUS ONCE
Status: COMPLETED | OUTPATIENT
Start: 2018-09-03 | End: 2018-09-03

## 2018-09-03 RX ORDER — CYCLOBENZAPRINE HCL 10 MG
10 TABLET ORAL 3 TIMES DAILY PRN
Qty: 15 TABLET | Refills: 0 | Status: SHIPPED | OUTPATIENT
Start: 2018-09-03 | End: 2018-09-08

## 2018-09-03 RX ORDER — DICLOFENAC SODIUM 75 MG/1
75 TABLET, DELAYED RELEASE ORAL 2 TIMES DAILY
Qty: 10 TABLET | Refills: 0 | Status: ON HOLD | OUTPATIENT
Start: 2018-09-03 | End: 2020-09-03 | Stop reason: ALTCHOICE

## 2018-09-03 RX ADMIN — MORPHINE SULFATE 4 MG: 4 INJECTION, SOLUTION INTRAMUSCULAR; INTRAVENOUS at 15:48

## 2018-09-03 RX ADMIN — SODIUM CHLORIDE 1000 ML: 9 INJECTION, SOLUTION INTRAVENOUS at 15:48

## 2018-09-03 RX ADMIN — ONDANSETRON 4 MG: 2 INJECTION INTRAMUSCULAR; INTRAVENOUS at 15:48

## 2018-09-03 ASSESSMENT — PAIN SCALES - GENERAL: PAINLEVEL_OUTOF10: 5

## 2018-09-03 NOTE — ED PROVIDER NOTES
Emergency Department Provider Note  Location: [address] [place] [address]  ED  9/3/2018     Patient Identification  Cristina Mckay is a 63 y.o. female    Chief Complaint  Abdominal Pain (RLQ pain that started last night; denies vomiting of fevers at this time; patient reports that pain radiates to flank on right side; reports increase in pain after eating last night )      Mode of Arrival  private car    HPI  (History provided by patient)  This is a 63 y.o. female with a PMH significant for herniated disc in c-spine s/p fusion presented today for right-sided flank pain that radiates into the abdomen. Patient reports sudden onset of sharp severe pain in the right flank area. Today the pain got worse. It started to radiate to the right lower abdomen. She denies dysuria, cloudy urine, urine frequency or urgency. No hematuria. No fever. She has no nausea or vomiting. No diarrhea. ROS  10 systems reviewed, pertinent positives per HPI otherwise noted to be negative     I have reviewed the following nursing documentation:  Allergies: Allergies   Allergen Reactions    Tetracyclines & Related Nausea And Vomiting    Erythromycin Base      nausea    Lisinopril      Flu like symptoms    Nsaids Nausea Only    Trazodone Other (See Comments)     Hyperactivity   jitteriness       Past medical history:  has a past medical history of BIPOLAR; Cardiomyopathy ([address] CHF (congestive heart failure) ([address] Fibromyalgia; GERD (gastroesophageal reflux disease); Hyperlipidemia; Hypertension; Palpitations; Plantar fasciitis (left lower extremity); TORN MENISCUS BOTH KNEES; and Vitamin D deficiency. Past surgical history:  has a past surgical history that includes Pacemaker insertion; Tonsillectomy;  section; Neck surgery; Breast enhancement surgery; Colonoscopy; Endoscopy, colon, diagnostic; and knee surgery (10/2011).     Home medications:   Prior to Admission medications    Medication Sig Start Date End Date increase in pain after eating last night ) Acuity: Acute Type of Exam: Initial FINDINGS: Lower Chest: No focal consolidation. Stable right basilar pulmonary nodule, right middle lobe. Stable punctate right lower lobe pulmonary nodule. Scattered small left lower lobe pulmonary nodules appear not significantly changed. Organs: No hydronephrosis. 13 mm size left adrenal adenoma. No radiopaque intrarenal stone identified. Unremarkable noncontrast appearance of the pancreas and spleen. Gallbladder without acute finding detected. GI/Bowel: Limited evaluation without enteric contrast.  Visualized appendix appears normal.  Mild constipation. Pelvis: No radiopaque stone identified within the urinary bladder. No pelvic adenopathy. Peritoneum/Retroperitoneum: Abdominal aorta normal in caliber. No retroperitoneal adenopathy. No mesenteric adenopathy. Bones/Soft Tissues: Moderate degenerative disc disease and facet hypertrophy at the L4-5 and L5-S1 level. No suspicious bony lesion is appreciated. Surgical changes are noted about the chest wall. No radiopaque intrarenal stone or hydronephrosis. No acute intra-process identified. Small pulmonary nodules at the lung bases, previously described on lung screening CT scan 06/26/2018. See that report for recommendations.          Labs  Results for orders placed or performed during the hospital encounter of 09/03/18   Comprehensive Metabolic Panel   Result Value Ref Range    Sodium 143 136 - 145 mmol/L    Potassium 3.8 3.5 - 5.1 mmol/L    Chloride 105 99 - 110 mmol/L    CO2 24 21 - 32 mmol/L    Anion Gap 14 3 - 16    Glucose 100 (H) 70 - 99 mg/dL    BUN 8 7 - 20 mg/dL    CREATININE 0.8 0.6 - 1.1 mg/dL    GFR Non-African American >60 >60    GFR African American >60 >60    Calcium 9.7 8.3 - 10.6 mg/dL    Total Protein 7.4 6.4 - 8.2 g/dL    Alb 4.4 3.4 - 5.0 g/dL    Albumin/Globulin Ratio 1.5 1.1 - 2.2    Total Bilirubin 0.3 0.0 - 1.0 mg/dL    Alkaline Phosphatase 71 40 - 129 dictating provider for clarification.      Blanca Ramsey MD  [facility] Franklin Bhakta MD  09/07/18 0159

## 2018-09-12 RX ORDER — ERGOCALCIFEROL 1.25 MG/1
CAPSULE ORAL
Qty: 4 CAPSULE | Refills: 4 | Status: SHIPPED | OUTPATIENT
Start: 2018-09-12 | End: 2019-02-01 | Stop reason: SDUPTHER

## 2018-09-14 LAB
B-TYPE NATRIURETIC PEPTIDE: 12 PG/ML
CHOLESTEROL, TOTAL: 160 MG/DL
CHOLESTEROL/HDL RATIO: NORMAL
HDLC SERPL-MCNC: 53 MG/DL (ref 35–70)
LDL CHOLESTEROL CALCULATED: 94 MG/DL (ref 0–160)
TRIGL SERPL-MCNC: 65 MG/DL
TSH SERPL DL<=0.05 MIU/L-ACNC: 4.6 UIU/ML
VLDLC SERPL CALC-MCNC: NORMAL MG/DL

## 2018-10-23 ENCOUNTER — OFFICE VISIT (OUTPATIENT)
Dept: CARDIOLOGY CLINIC | Age: 58
End: 2018-10-23
Payer: MEDICARE

## 2018-10-23 ENCOUNTER — PROCEDURE VISIT (OUTPATIENT)
Dept: CARDIOLOGY CLINIC | Age: 58
End: 2018-10-23
Payer: MEDICARE

## 2018-10-23 VITALS
HEIGHT: 64 IN | OXYGEN SATURATION: 98 % | DIASTOLIC BLOOD PRESSURE: 72 MMHG | BODY MASS INDEX: 31.76 KG/M2 | HEART RATE: 76 BPM | WEIGHT: 186 LBS | SYSTOLIC BLOOD PRESSURE: 124 MMHG

## 2018-10-23 DIAGNOSIS — I10 ESSENTIAL HYPERTENSION: ICD-10-CM

## 2018-10-23 DIAGNOSIS — Z95.810 AUTOMATIC IMPLANTABLE CARDIOVERTER-DEFIBRILLATOR IN SITU: ICD-10-CM

## 2018-10-23 DIAGNOSIS — Z95.810 CARDIAC DEFIBRILLATOR IN PLACE: Primary | ICD-10-CM

## 2018-10-23 DIAGNOSIS — I50.22 SYSTOLIC CHF, CHRONIC (HCC): Primary | ICD-10-CM

## 2018-10-23 DIAGNOSIS — I50.22 SYSTOLIC CHF, CHRONIC (HCC): ICD-10-CM

## 2018-10-23 DIAGNOSIS — R06.02 SOB (SHORTNESS OF BREATH): ICD-10-CM

## 2018-10-23 DIAGNOSIS — E78.00 PURE HYPERCHOLESTEROLEMIA: ICD-10-CM

## 2018-10-23 DIAGNOSIS — I42.9 CARDIOMYOPATHY, IDIOPATHIC (HCC): ICD-10-CM

## 2018-10-23 PROCEDURE — 93284 PRGRMG EVAL IMPLANTABLE DFB: CPT | Performed by: INTERNAL MEDICINE

## 2018-10-23 PROCEDURE — 99214 OFFICE O/P EST MOD 30 MIN: CPT | Performed by: INTERNAL MEDICINE

## 2018-10-23 PROCEDURE — 93290 INTERROG DEV EVAL ICPMS IP: CPT | Performed by: INTERNAL MEDICINE

## 2018-10-23 RX ORDER — TOPIRAMATE 100 MG/1
100 TABLET, FILM COATED ORAL DAILY
COMMUNITY

## 2018-10-23 RX ORDER — IRBESARTAN 300 MG/1
300 TABLET ORAL NIGHTLY
Qty: 90 TABLET | Refills: 3 | Status: SHIPPED | OUTPATIENT
Start: 2018-10-23 | End: 2020-11-24

## 2018-10-23 NOTE — PROGRESS NOTES
 BREAST ENHANCEMENT SURGERY       SECTION      x3    COLONOSCOPY      ENDOSCOPY, COLON, DIAGNOSTIC      KNEE SURGERY  10/2011    Left Maniscus    NECK SURGERY      FUSION C5/6 WITH CADAVAR BONES 2002    PACEMAKER INSERTION      medtronic BiV AICD, with optivol    TONSILLECTOMY       Family History   Problem Relation Age of Onset    Kidney Disease Mother     Stroke Father     High Blood Pressure Father     Heart Disease Father     High Blood Pressure Brother     Diabetes Brother      Social History     Social History    Marital status:      Spouse name: N/A    Number of children: N/A    Years of education: N/A     Occupational History    Not on file. Social History Main Topics    Smoking status: Former Smoker     Packs/day: 0.99     Years: 33.00     Quit date: 2014    Smokeless tobacco: Never Used    Alcohol use No    Drug use: No    Sexual activity: Not Currently     Other Topics Concern    Not on file     Social History Narrative    No narrative on file       Review of Systems:   · Constitutional: there has been no unanticipated weight loss. There's been no change in energy level, sleep pattern, or activity level. · Eyes: No visual changes or diplopia. No scleral icterus. · ENT: No Headaches, hearing loss or vertigo. No mouth sores or sore throat. · Cardiovascular: No chest pain or palpitations but worsening edema. · Respiratory: SOL worsening   · Gastrointestinal: No abdominal pain, appetite loss, blood in stools. No change in bowel or bladder habits. · Genitourinary: No dysuria, trouble voiding, or hematuria. · Musculoskeletal:  No gait disturbance, weakness or joint complaints. · Integumentary: No rash or pruritis. · Neurological: No headache, diplopia, change in muscle strength, numbness or tingling. No change in gait, balance, coordination, mood, affect, memory, mentation, behavior. · Psychiatric: No anxiety, no depression.   · Endocrine: No today      Plan:  1. Increase irbesartan to 300 mg nightly  2. CBC, CMP, BNP and lipids in 6 months  3. Echo in 2 years  4. Follow up in 6 months      QUALITY MEASURES  1. Tobacco Cessation Counseling:  N/A (quit 2010)  2. Retake of BP if >140/90:   N/A  3. CAD patient on anti-platelet: N/A    4. CAD patient on STATIN therapy:  N/A (lipitor for HLD)  5. Patient with CHF and aFib on anticoagulation:  N/A     I appreciate the opportunity of cooperating in the care of this individual.    Scribe's attestation: This note was scribed in the presence of Carmelita Rebolledo M.D. By Isabell Landeros RN     The scribe's documentation has been prepared under my direction and personally reviewed by me in its entirety. I confirm that the note above accurately reflects all work, treatment, procedures, and medical decision making performed by me.       Carmelita Rebolledo M.D., [facility] [place]

## 2018-10-23 NOTE — LETTER
Low normal LV systolic function with EF of 50-55%  Abnormal (paradoxical) septal motion consistent with RV pacemaker  Reversal of E/A inflow velocities across the mitral valve suggesting  impaired left ventricular relaxation. Mild mitral and tricuspid regurgitation with RVSP estimated at 30 mmHg. Moderate aortic regurgitation is present. Pacer lead noted in right heart. Pro bnp 81   6/09: EF 25-30%, echo 11/10: EF 55%, 6/09 medtronic bi-v AICD, EF 50% from ECHO    4. CHF (congestive heart failure) - compensated today    5. Palpitations - no complaints today      Plan:  1. Increase irbesartan to 300 mg nightly  2. CBC, CMP, BNP and lipids in 6 months  3. Echo in 2 years  4. Follow up in 6 months      QUALITY MEASURES  1. Tobacco Cessation Counseling:  N/A (quit 2010)  2. Retake of BP if >140/90:   N/A  3. CAD patient on anti-platelet: N/A    4. CAD patient on STATIN therapy:  N/A (lipitor for HLD)  5. Patient with CHF and aFib on anticoagulation:  N/A     I appreciate the opportunity of cooperating in the care of this individual.    Scribe's attestation: This note was scribed in the presence of Lanette Spence M.D. By Conrad Mendez RN     The scribe's documentation has been prepared under my direction and personally reviewed by me in its entirety. I confirm that the note above accurately reflects all work, treatment, procedures, and medical decision making performed by me. Lanette Spence M.D., [facility] [place]                               If you have questions, please do not hesitate to call me. I look forward to following [facility] along with you.     Sincerely,        Eric Ramirez MD

## 2018-10-26 NOTE — COMMUNICATION BODY
[facility]   Advanced Heart Failure/Pulmonary Hypertension  Cardiac Follow up       Brian Bhatti  YOB: 1960    Date of Visit:  10/23/18  Chief Complaint   Patient presents with    Congestive Heart Failure     History of Present Illness:  Ms Yeison Rogers comes to the office for follow up of her nonischemic cardiomyopathy with LBBB. She had a RHC 1/7/09 which showed normal coronaries and normal cardiac output. She was told years ago that her EF was 20-25%. 8/08 she had an angiogram that showed normal coronaries and EF 30%. However, MUGA scan revealed EF 61%. June, 2009 she had placement of bi-v defibrillator, and has had no shocks. Echo 10/09 showed EF 40-45% and decrease in left ventricular size. She had an echo today that showed EF 50%. She has been granted disability for her heart and has been out of work. As part of her history was told she has a slight underactive thyroid. Her echo from 3/23/17 shows an EF of 50-55%. Her device check from 3/23/17 shows normal device sensing and function with normal fluid level on her Optivol. She lost her mother to lung cancer in September 2017. Today, she reports she has asthma and sees Dr Amee Snyder. She uses Breo daily which has resolved her asthma symptoms. She reports she feels well overall. She denies CP, increased SOB, dizziness or syncope. She reports she has elevated BPs at home. Her BP today on exam was controlled. Per vitals flowsheet, her SBP in Sept 2018 was in the 160s.     Allergies   Allergen Reactions    Tetracyclines & Related Nausea And Vomiting    Erythromycin Base      nausea    Lisinopril      Flu like symptoms    Nsaids Nausea Only    Trazodone Other (See Comments)     Hyperactivity   jitteriness     Current Outpatient Prescriptions   Medication Sig Dispense Refill    topiramate (TOPAMAX) 100 MG tablet Take 100 mg by mouth daily      vitamin D (ERGOCALCIFEROL) 83333 units CAPS capsule TAKE 1 CAPSULE BY MOUTH ONCE WEEK 4 12/03/2014    GLUCOSE 100 09/03/2018    GLUCOSE 96 12/03/2014     Lab Results   Component Value Date    BNP 12 09/14/2018    BNP 37 09/07/2011    BNP 39 05/31/2011     Lab Results   Component Value Date    ALT 19 09/03/2018    ALT 36 03/28/2018    AST 18 09/03/2018    AST 32 03/28/2018     Lab Results   Component Value Date    HGB 14.5 09/03/2018    HGB 14.6 03/28/2018    HCT 41.8 09/03/2018    HCT 43.3 03/28/2018     09/03/2018     03/28/2018     Lab Results   Component Value Date    TRIG 65 09/14/2018    TRIG 119 12/03/2014    HDL 53 09/14/2018    HDL 53 03/28/2018    HDL 59 05/31/2011    HDL 53 02/08/2010    LDLCALC 94 09/14/2018    LDLCALC 106 03/28/2018      Echo 3/23/17   Low normal left ventricle systolic function with an estimated ejection   fraction of 50%. EF by Cornejo's method is 51%. There is hypokinesis of the inferoseptal and apical inferior walls. Grade I diastolic dysfunction with normal filing pressure. Mild mitral regurgitation. Mild to moderate aortic regurgitation. Systolic pulmonary artery pressure (SPAP) is normal and estimated at 20 mmHg   (RA pressure 3 mmHg). Assessment:    1. HYPERTENSION - controlled    2. Hyperlipidemia -  3/28/2018 TC= 189; TG= 150;HDL= 53; LDL= 106 good control on Lipitor . ldl is slightly up from previous one and not quite at goal of 100 or less. 3. Cardiomyopathy 1/14 Summary  Low normal LV systolic function with EF of 50-55%  Abnormal (paradoxical) septal motion consistent with RV pacemaker  Reversal of E/A inflow velocities across the mitral valve suggesting  impaired left ventricular relaxation. Mild mitral and tricuspid regurgitation with RVSP estimated at 30 mmHg. Moderate aortic regurgitation is present. Pacer lead noted in right heart. Pro bnp 81   6/09: EF 25-30%, echo 11/10: EF 55%, 6/09 medtronic bi-v AICD, EF 50% from ECHO    4. CHF (congestive heart failure) - compensated today    5.  Palpitations - no complaints today      Plan:  1. Increase irbesartan to 300 mg nightly  2. CBC, CMP, BNP and lipids in 6 months  3. Echo in 2 years  4. Follow up in 6 months      QUALITY MEASURES  1. Tobacco Cessation Counseling:  N/A (quit 2010)  2. Retake of BP if >140/90:   N/A  3. CAD patient on anti-platelet: N/A    4. CAD patient on STATIN therapy:  N/A (lipitor for HLD)  5. Patient with CHF and aFib on anticoagulation:  N/A     I appreciate the opportunity of cooperating in the care of this individual.    Scribe's attestation: This note was scribed in the presence of Melva Gamble M.D. By Milton Hernández RN     The scribe's documentation has been prepared under my direction and personally reviewed by me in its entirety. I confirm that the note above accurately reflects all work, treatment, procedures, and medical decision making performed by me.       Melva Gamble M.D., [facility] [place]

## 2018-10-31 ENCOUNTER — HOSPITAL ENCOUNTER (OUTPATIENT)
Dept: PULMONOLOGY | Age: 58
Discharge: HOME | End: 2018-10-31
Payer: MEDICARE

## 2018-10-31 ENCOUNTER — OFFICE VISIT (OUTPATIENT)
Dept: PULMONOLOGY | Age: 58
End: 2018-10-31
Payer: MEDICARE

## 2018-10-31 VITALS
HEART RATE: 63 BPM | OXYGEN SATURATION: 98 % | SYSTOLIC BLOOD PRESSURE: 120 MMHG | HEIGHT: 64 IN | DIASTOLIC BLOOD PRESSURE: 78 MMHG | WEIGHT: 184 LBS | RESPIRATION RATE: 16 BRPM | BODY MASS INDEX: 31.41 KG/M2 | TEMPERATURE: 98.1 F

## 2018-10-31 VITALS — OXYGEN SATURATION: 97 %

## 2018-10-31 DIAGNOSIS — J45.40 MODERATE PERSISTENT ASTHMA WITHOUT COMPLICATION: ICD-10-CM

## 2018-10-31 DIAGNOSIS — Z23 NEED FOR INFLUENZA VACCINATION: Primary | ICD-10-CM

## 2018-10-31 PROCEDURE — 99213 OFFICE O/P EST LOW 20 MIN: CPT | Performed by: INTERNAL MEDICINE

## 2018-10-31 PROCEDURE — 90688 IIV4 VACCINE SPLT 0.5 ML IM: CPT | Performed by: INTERNAL MEDICINE

## 2018-10-31 PROCEDURE — 94060 EVALUATION OF WHEEZING: CPT

## 2018-10-31 PROCEDURE — 94640 AIRWAY INHALATION TREATMENT: CPT

## 2018-10-31 PROCEDURE — G0008 ADMIN INFLUENZA VIRUS VAC: HCPCS | Performed by: INTERNAL MEDICINE

## 2018-10-31 PROCEDURE — 94664 DEMO&/EVAL PT USE INHALER: CPT

## 2018-10-31 PROCEDURE — 94760 N-INVAS EAR/PLS OXIMETRY 1: CPT

## 2018-10-31 PROCEDURE — 6360000002 HC RX W HCPCS: Performed by: INTERNAL MEDICINE

## 2018-10-31 PROCEDURE — 94729 DIFFUSING CAPACITY: CPT

## 2018-10-31 PROCEDURE — 94726 PLETHYSMOGRAPHY LUNG VOLUMES: CPT

## 2018-10-31 RX ORDER — ALBUTEROL SULFATE 2.5 MG/3ML
2.5 SOLUTION RESPIRATORY (INHALATION) ONCE
Status: COMPLETED | OUTPATIENT
Start: 2018-10-31 | End: 2018-10-31

## 2018-10-31 RX ORDER — FLUTICASONE PROPIONATE AND SALMETEROL 232; 14 UG/1; UG/1
1 POWDER, METERED RESPIRATORY (INHALATION) 2 TIMES DAILY
Qty: 1 EACH | Refills: 5 | Status: SHIPPED | OUTPATIENT
Start: 2018-10-31 | End: 2019-10-22

## 2018-10-31 RX ADMIN — ALBUTEROL SULFATE 2.5 MG: 2.5 SOLUTION RESPIRATORY (INHALATION) at 11:48

## 2018-10-31 NOTE — PATIENT INSTRUCTIONS
amount of a mercury-based preservative called thimerosal. Studies have not shown thimerosal in vaccines to be harmful, but flu vaccines that do not contain thimerosal are available. There is no live flu virus in flu shots. They cannot cause the flu. There are many flu viruses, and they are always changing. Each year a new flu vaccine is made to protect against three or four viruses that are likely to cause disease in the upcoming flu season. But even when the vaccine doesnt exactly match these viruses, it may still provide some protection    Flu vaccine cannot prevent:   flu that is caused by a virus not covered by the vaccine, or   illnesses that look like flu but are not. It takes about 2 weeks for protection to develop after vaccination, and protection lasts through the flu season. 3. Some people should not get this vaccine    Tell the person who is giving you the vaccine:     If you have any severe, life-threatening allergies. If you ever had a life-threatening allergic reaction after a dose of flu vaccine, or have a severe allergy to any part of this vaccine, you may be advised not to get vaccinated. Most, but not all, types of flu vaccine contain a small amount of egg protein.  If you ever had Guillain-Barré Syndrome (also called GBS). Some people with a history of GBS should not get this vaccine. This should be discussed with your doctor.  If you are not feeling well. It is usually okay to get flu vaccine when you have a mild illness, but you might be asked to come back when you feel better. 4. Risks of a vaccine reaction    With any medicine, including vaccines, there is a chance of reactions. These are usually mild and go away on their own, but serious reactions are also possible. Most people who get a flu shot do not have any problems with it.      Minor problems following a flu shot include:    soreness, redness, or swelling where the shot was given behavior. Signs of a severe allergic reaction can include hives, swelling of the face and throat, difficulty breathing, a fast heartbeat, dizziness, and weakness - usually within a few minutes to a few hours after the vaccination. What should I do?  If you think it is a severe allergic reaction or other emergency that cant wait, call 9-1-1 and get the person to the nearest hospital. Otherwise, call your doctor.  Reactions should be reported to the Vaccine Adverse Event Reporting System (VAERS). Your doctor should file this report, or you can do it yourself through  the VAERS web site at www.vaers. [facility].gov, or by calling 7-673.820.6910. VAERS does not give medical advice. 6. The National Vaccine Injury Compensation Program    The [facility] Vaccine Injury Compensation Program (VICP) is a federal program that was created to compensate people who may have been injured by certain vaccines. Persons who believe they may have been injured by a vaccine can learn about the program and about filing a claim by calling 5-711.207.6190 or visiting the [employer] website at www.[facility].gov/vaccinecompensation. There is a time limit to file a claim for compensation. 7. How can I learn more?  Ask your healthcare provider. He or she can give you the vaccine package insert or suggest other sources of information.  Call your local or state health department.  Contact the Centers for Disease Control and Prevention (CDC):  - Call 4-919.757.2443 (1-800-CDC-INFO) or  - Visit CDCs website at www.cdc.gov/flu    Vaccine Information Statement   Inactivated Influenza Vaccine   8/7/2015  42 MINNA Ghosh [id number]    Department of Health and Human Services  Centers for Disease Control and Prevention    Office Use Only

## 2018-10-31 NOTE — PROGRESS NOTES
PULMONARY, CRITICAL CARE AND SLEEP MEDICINE     CC/REFERRING PROVIDER:  Patient is being seen at the request of Dr. Steffany Vargas for a consultation for shortness of breath      Interval History:     ·  doing well with Breo. Cost is issue $185 per month    PRESENTING HPI: 62 yo WF with non ischemic cardiomyopathy, over the winter 2018 debilitating shortness of breath and cough, lost voice. Over the last few weeks, with central air much less coughing and breathing is better. Over the last few days, coughing more at night. No family history of asthma, but did have asthma as a child. Of note, she cannot take a single big breath without coughing, she cannot exercise at all without shortness of breath and cough. reports that she quit smoking about 3 years ago. She has a 32.67 pack-year smoking history. She has never used smokeless tobacco.  Just less than 1 ppd X 35 years       PHYSICAL EXAM:  Blood pressure 120/78, pulse 63, temperature 98.1 °F (36.7 °C), temperature source Oral, resp. rate 16, height 5' 4\" (1.626 m), weight 184 lb (83.5 kg), SpO2 98 %.'  Constitutional:  No acute distress. HENT:  Oropharynx is clear and moist.   Neck: No tracheal deviation present. Cardiovascular: Normal heart sounds. No lower extremity edema. Pulmonary/Chest: No wheezes. No rhonchi. No rales. No decreased breath sounds. No accessory muscle usage or stridor. Musculoskeletal: No cyanosis. No clubbing. Skin: Skin is warm and dry. Psychiatric: Normal mood and affect. Neurologic: speech fluent, alert and oriented, strength symmetric      DATA:  LDCT 6/26/18  FINDINGS:   Mediastinum: Coronary artery calcifications are a marker of atherosclerosis. A 3 lead ICD is in situ.  There are no enlarged thoracic lymph nodes.       Lungs/pleura:  The tracheobronchial tree is patent. Lucyann Shark is no pneumothorax   or pleural effusion. Lucyann Shark is bibasilar scarring and/or atelectasis.       There are multiple solid subcentimeter bilateral

## 2019-01-22 DIAGNOSIS — E55.9 VITAMIN D INSUFFICIENCY: ICD-10-CM

## 2019-01-22 DIAGNOSIS — R06.02 SOB (SHORTNESS OF BREATH): ICD-10-CM

## 2019-01-22 DIAGNOSIS — I10 ESSENTIAL HYPERTENSION: ICD-10-CM

## 2019-01-22 DIAGNOSIS — I50.22 SYSTOLIC CHF, CHRONIC (HCC): ICD-10-CM

## 2019-01-22 DIAGNOSIS — E78.00 PURE HYPERCHOLESTEROLEMIA: ICD-10-CM

## 2019-01-23 RX ORDER — ATORVASTATIN CALCIUM 20 MG/1
TABLET, FILM COATED ORAL
Qty: 90 TABLET | Refills: 1 | Status: SHIPPED | OUTPATIENT
Start: 2019-01-23 | End: 2019-10-08 | Stop reason: SDUPTHER

## 2019-02-01 RX ORDER — ERGOCALCIFEROL 1.25 MG/1
CAPSULE ORAL
Qty: 12 CAPSULE | Refills: 1 | Status: SHIPPED | OUTPATIENT
Start: 2019-02-01 | End: 2019-10-22

## 2019-07-15 RX ORDER — FLUTICASONE FUROATE AND VILANTEROL 200; 25 UG/1; UG/1
1 POWDER RESPIRATORY (INHALATION) DAILY
Qty: 3 EACH | Refills: 3 | Status: SHIPPED | OUTPATIENT
Start: 2019-07-15 | End: 2019-10-22

## 2019-07-17 DIAGNOSIS — I10 ESSENTIAL HYPERTENSION: ICD-10-CM

## 2019-07-17 DIAGNOSIS — E78.00 PURE HYPERCHOLESTEROLEMIA: ICD-10-CM

## 2019-07-17 DIAGNOSIS — E55.9 VITAMIN D INSUFFICIENCY: ICD-10-CM

## 2019-07-17 DIAGNOSIS — R06.02 SOB (SHORTNESS OF BREATH): ICD-10-CM

## 2019-07-17 DIAGNOSIS — I50.22 SYSTOLIC CHF, CHRONIC (HCC): ICD-10-CM

## 2019-07-18 RX ORDER — ATORVASTATIN CALCIUM 20 MG/1
TABLET, FILM COATED ORAL
Qty: 90 TABLET | Refills: 0 | OUTPATIENT
Start: 2019-07-18

## 2019-07-23 ENCOUNTER — HOSPITAL ENCOUNTER (OUTPATIENT)
Dept: CT IMAGING | Age: 59
Discharge: HOME | End: 2019-07-23
Payer: MEDICARE

## 2019-07-23 ENCOUNTER — TELEPHONE (OUTPATIENT)
Dept: PULMONOLOGY | Age: 59
End: 2019-07-23

## 2019-07-23 DIAGNOSIS — R91.1 PULMONARY NODULE: ICD-10-CM

## 2019-07-23 DIAGNOSIS — R91.1 PULMONARY NODULE: Primary | ICD-10-CM

## 2019-07-23 PROCEDURE — 71250 CT THORAX DX C-: CPT

## 2019-08-08 ENCOUNTER — TELEPHONE (OUTPATIENT)
Dept: CARDIOLOGY CLINIC | Age: 59
End: 2019-08-08

## 2019-10-08 DIAGNOSIS — I10 ESSENTIAL HYPERTENSION: ICD-10-CM

## 2019-10-08 DIAGNOSIS — E78.00 PURE HYPERCHOLESTEROLEMIA: ICD-10-CM

## 2019-10-08 DIAGNOSIS — R06.02 SOB (SHORTNESS OF BREATH): ICD-10-CM

## 2019-10-08 DIAGNOSIS — E55.9 VITAMIN D INSUFFICIENCY: ICD-10-CM

## 2019-10-08 DIAGNOSIS — I50.22 SYSTOLIC CHF, CHRONIC (HCC): ICD-10-CM

## 2019-10-09 ENCOUNTER — TELEPHONE (OUTPATIENT)
Dept: PULMONOLOGY | Age: 59
End: 2019-10-09

## 2019-10-09 RX ORDER — ATORVASTATIN CALCIUM 20 MG/1
TABLET, FILM COATED ORAL
Qty: 30 TABLET | Refills: 0 | Status: SHIPPED | OUTPATIENT
Start: 2019-10-09 | End: 2019-11-08 | Stop reason: SDUPTHER

## 2019-10-22 ENCOUNTER — NURSE ONLY (OUTPATIENT)
Dept: CARDIOLOGY CLINIC | Age: 59
End: 2019-10-22
Payer: MEDICARE

## 2019-10-22 ENCOUNTER — OFFICE VISIT (OUTPATIENT)
Dept: CARDIOLOGY CLINIC | Age: 59
End: 2019-10-22
Payer: MEDICARE

## 2019-10-22 VITALS
BODY MASS INDEX: 32.95 KG/M2 | OXYGEN SATURATION: 96 % | WEIGHT: 193 LBS | HEIGHT: 64 IN | SYSTOLIC BLOOD PRESSURE: 134 MMHG | HEART RATE: 87 BPM | DIASTOLIC BLOOD PRESSURE: 82 MMHG

## 2019-10-22 DIAGNOSIS — I10 ESSENTIAL HYPERTENSION: ICD-10-CM

## 2019-10-22 DIAGNOSIS — R42 VERTIGO: ICD-10-CM

## 2019-10-22 DIAGNOSIS — R06.02 SOB (SHORTNESS OF BREATH): ICD-10-CM

## 2019-10-22 DIAGNOSIS — Z95.810 CARDIAC DEFIBRILLATOR IN PLACE: ICD-10-CM

## 2019-10-22 DIAGNOSIS — I50.22 SYSTOLIC CHF, CHRONIC (HCC): ICD-10-CM

## 2019-10-22 DIAGNOSIS — R00.2 PALPITATIONS: ICD-10-CM

## 2019-10-22 DIAGNOSIS — Z95.810 AUTOMATIC IMPLANTABLE CARDIOVERTER-DEFIBRILLATOR IN SITU: ICD-10-CM

## 2019-10-22 DIAGNOSIS — I50.22 SYSTOLIC CHF, CHRONIC (HCC): Primary | ICD-10-CM

## 2019-10-22 DIAGNOSIS — R53.83 OTHER FATIGUE: ICD-10-CM

## 2019-10-22 DIAGNOSIS — E78.00 PURE HYPERCHOLESTEROLEMIA: ICD-10-CM

## 2019-10-22 DIAGNOSIS — I42.9 CARDIOMYOPATHY, IDIOPATHIC (HCC): ICD-10-CM

## 2019-10-22 PROCEDURE — 93000 ELECTROCARDIOGRAM COMPLETE: CPT | Performed by: INTERNAL MEDICINE

## 2019-10-22 PROCEDURE — 99214 OFFICE O/P EST MOD 30 MIN: CPT | Performed by: INTERNAL MEDICINE

## 2019-10-22 PROCEDURE — 93284 PRGRMG EVAL IMPLANTABLE DFB: CPT | Performed by: INTERNAL MEDICINE

## 2019-10-22 PROCEDURE — 93290 INTERROG DEV EVAL ICPMS IP: CPT | Performed by: INTERNAL MEDICINE

## 2019-10-22 RX ORDER — AMLODIPINE BESYLATE 2.5 MG/1
2.5 TABLET ORAL DAILY
COMMUNITY
End: 2020-11-24 | Stop reason: SDUPTHER

## 2019-10-22 RX ORDER — MECLIZINE HCL 12.5 MG/1
12.5 TABLET ORAL 2 TIMES DAILY
COMMUNITY
End: 2022-01-18

## 2019-10-23 ENCOUNTER — HOSPITAL ENCOUNTER (OUTPATIENT)
Age: 59
Discharge: HOME | End: 2019-10-23
Payer: MEDICARE

## 2019-10-23 DIAGNOSIS — I50.22 SYSTOLIC CHF, CHRONIC (HCC): ICD-10-CM

## 2019-10-23 DIAGNOSIS — R53.83 OTHER FATIGUE: ICD-10-CM

## 2019-10-23 DIAGNOSIS — E78.00 PURE HYPERCHOLESTEROLEMIA: ICD-10-CM

## 2019-10-23 LAB
A/G RATIO: 1.6 (ref 1.1–2.2)
ALBUMIN SERPL-MCNC: 4.5 G/DL (ref 3.4–5)
ALP BLD-CCNC: 77 U/L (ref 40–129)
ALT SERPL-CCNC: 23 U/L (ref 10–40)
ANION GAP SERPL CALCULATED.3IONS-SCNC: 13 MMOL/L (ref 3–16)
AST SERPL-CCNC: 21 U/L (ref 15–37)
BASOPHILS ABSOLUTE: 0 K/UL (ref 0–0.2)
BASOPHILS RELATIVE PERCENT: 0.3 %
BILIRUB SERPL-MCNC: 0.5 MG/DL (ref 0–1)
BUN BLDV-MCNC: 17 MG/DL (ref 7–20)
CALCIUM SERPL-MCNC: 10.2 MG/DL (ref 8.3–10.6)
CHLORIDE BLD-SCNC: 104 MMOL/L (ref 99–110)
CHOLESTEROL, TOTAL: 228 MG/DL (ref 0–199)
CO2: 23 MMOL/L (ref 21–32)
CREAT SERPL-MCNC: 1 MG/DL (ref 0.6–1.1)
EOSINOPHILS ABSOLUTE: 0.1 K/UL (ref 0–0.6)
EOSINOPHILS RELATIVE PERCENT: 1.3 %
GFR AFRICAN AMERICAN: >60
GFR NON-AFRICAN AMERICAN: 57
GLOBULIN: 2.9 G/DL
GLUCOSE BLD-MCNC: 122 MG/DL (ref 70–99)
HCT VFR BLD CALC: 43.4 % (ref 36–48)
HDLC SERPL-MCNC: 91 MG/DL (ref 40–60)
HEMOGLOBIN: 14.5 G/DL (ref 12–16)
LDL CHOLESTEROL CALCULATED: 101 MG/DL
LYMPHOCYTES ABSOLUTE: 3.2 K/UL (ref 1–5.1)
LYMPHOCYTES RELATIVE PERCENT: 37 %
MCH RBC QN AUTO: 32.6 PG (ref 26–34)
MCHC RBC AUTO-ENTMCNC: 33.3 G/DL (ref 31–36)
MCV RBC AUTO: 97.9 FL (ref 80–100)
MONOCYTES ABSOLUTE: 0.6 K/UL (ref 0–1.3)
MONOCYTES RELATIVE PERCENT: 7.5 %
NEUTROPHILS ABSOLUTE: 4.6 K/UL (ref 1.7–7.7)
NEUTROPHILS RELATIVE PERCENT: 53.9 %
PDW BLD-RTO: 13.2 % (ref 12.4–15.4)
PLATELET # BLD: 210 K/UL (ref 135–450)
PMV BLD AUTO: 8.1 FL (ref 5–10.5)
POTASSIUM SERPL-SCNC: 4.6 MMOL/L (ref 3.5–5.1)
PRO-BNP: 245 PG/ML (ref 0–124)
RBC # BLD: 4.44 M/UL (ref 4–5.2)
SODIUM BLD-SCNC: 140 MMOL/L (ref 136–145)
T4 FREE: 1.2 NG/DL (ref 0.9–1.8)
TOTAL PROTEIN: 7.4 G/DL (ref 6.4–8.2)
TRIGL SERPL-MCNC: 181 MG/DL (ref 0–150)
TSH REFLEX: 6.2 UIU/ML (ref 0.27–4.2)
VLDLC SERPL CALC-MCNC: 36 MG/DL
WBC # BLD: 8.6 K/UL (ref 4–11)

## 2019-10-23 PROCEDURE — 84443 ASSAY THYROID STIM HORMONE: CPT

## 2019-10-23 PROCEDURE — 83880 ASSAY OF NATRIURETIC PEPTIDE: CPT

## 2019-10-23 PROCEDURE — 80053 COMPREHEN METABOLIC PANEL: CPT

## 2019-10-23 PROCEDURE — 36415 COLL VENOUS BLD VENIPUNCTURE: CPT

## 2019-10-23 PROCEDURE — 80061 LIPID PANEL: CPT

## 2019-10-23 PROCEDURE — 84439 ASSAY OF FREE THYROXINE: CPT

## 2019-10-23 PROCEDURE — 85025 COMPLETE CBC W/AUTO DIFF WBC: CPT

## 2019-11-08 DIAGNOSIS — I50.22 SYSTOLIC CHF, CHRONIC (HCC): ICD-10-CM

## 2019-11-08 DIAGNOSIS — R06.02 SOB (SHORTNESS OF BREATH): ICD-10-CM

## 2019-11-08 DIAGNOSIS — I10 ESSENTIAL HYPERTENSION: ICD-10-CM

## 2019-11-08 DIAGNOSIS — E78.00 PURE HYPERCHOLESTEROLEMIA: ICD-10-CM

## 2019-11-08 DIAGNOSIS — E55.9 VITAMIN D INSUFFICIENCY: ICD-10-CM

## 2019-11-08 RX ORDER — ATORVASTATIN CALCIUM 20 MG/1
TABLET, FILM COATED ORAL
Qty: 90 TABLET | Refills: 3 | Status: SHIPPED | OUTPATIENT
Start: 2019-11-08 | End: 2020-11-04

## 2019-11-25 ENCOUNTER — TELEPHONE (OUTPATIENT)
Dept: PULMONOLOGY | Age: 59
End: 2019-11-25

## 2020-02-10 ENCOUNTER — OFFICE VISIT (OUTPATIENT)
Dept: PULMONOLOGY | Age: 60
End: 2020-02-10
Payer: MEDICARE

## 2020-02-10 VITALS
BODY MASS INDEX: 33.12 KG/M2 | HEIGHT: 64 IN | OXYGEN SATURATION: 97 % | SYSTOLIC BLOOD PRESSURE: 140 MMHG | WEIGHT: 194 LBS | DIASTOLIC BLOOD PRESSURE: 82 MMHG | HEART RATE: 104 BPM

## 2020-02-10 PROCEDURE — 99213 OFFICE O/P EST LOW 20 MIN: CPT | Performed by: INTERNAL MEDICINE

## 2020-02-10 NOTE — PROGRESS NOTES
PULMONARY, CRITICAL CARE AND SLEEP MEDICINE     CC/REFERRING PROVIDER:  Patient is being seen at the request of Dr. Unruly Parada for a consultation for shortness of breath      Interval History:      doing well with Breo. Does not need every day. Wheezing has essentially resolved. Very rare use of albuterol. PRESENTING HPI: 64 yo WF with non ischemic cardiomyopathy, over the winter 2018 debilitating shortness of breath and cough, lost voice. Over the last few weeks, with central air much less coughing and breathing is better. Over the last few days, coughing more at night. No family history of asthma, but did have asthma as a child. Of note, she cannot take a single big breath without coughing, she cannot exercise at all without shortness of breath and cough. reports that she quit smoking about 5 years ago. She has a 32.67 pack-year smoking history. She has never used smokeless tobacco.  Just less than 1 ppd X 35 years       PHYSICAL EXAM:  Blood pressure (!) 140/82, pulse 104, height 5' 4\" (1.626 m), weight 194 lb (88 kg), SpO2 97 %.'  Constitutional:  No acute distress. HENT:  Oropharynx is clear and moist.   Neck: No tracheal deviation present. Cardiovascular: Normal heart sounds. No lower extremity edema. Pulmonary/Chest: No wheezes. No rhonchi. No rales. No decreased breath sounds. No accessory muscle usage or stridor. Musculoskeletal: No cyanosis. No clubbing. Skin: Skin is warm and dry. Psychiatric: Normal mood and affect. Neurologic: speech fluent, alert and oriented, strength symmetric      DATA:  LDCT 7/23/19  Mediastinum: Thyroid gland appears normal.  No pericardial effusion.    Coronary artery calcification is seen. Nisreen Clayman artifact is seen from pacer       Lungs/pleura: Respiratory motion artifact limits evaluation of fine pulmonary   parenchymal change.  No pneumonia.  No edema.  No pleural effusion.  No large   blebs or bulla       A few scattered noncalcified pulmonary nodules current smoker and, if appropriate, furnishing of information about tobacco cessation interventions; and   I have furnished a written order for lung cancer screening with LDCT. Order for Screening chest CT scan should be placed with documentation as below:  Beneficiary date of birth;    Actual pack - year smoking history (number) from above;   Current smoking status, and for former smokers, the number of years since quitting smoking from above  Beneficiary is asymptomatic   National Provider Identifier (NPI) for Connor Santos [id number]

## 2020-02-10 NOTE — PROGRESS NOTES
punctate pulmonary nodules are seen on the right.  These   measure 4-5 mm in size or less.  The largest of which is seen along the minor   fissure on the right, similar prior.       Upper Abdomen: Hypodense nodule seen in the adrenal glands, likely due to   adenoma.       Soft Tissues/Bones: Breast implants are seen.  Degenerative changes are seen   in the spine and shoulder joints.       Healing rib fractures are seen on the right           Impression   Scattered tiny pulmonary nodules, similar to prior. PFT 10/31/18 FEV1 2.42 L 92% TLC 4.98 L 95%  DLCO 18.64 79%    ASSESSMENT:  Moderate persistent asthma    Not addressed today  Pulmonary Nodules   Non ischemic cardiomyopathy, f/b Dr. Pastora Perez, improved on f/u ECHO  Biventricular pacemaker  Family h/o lung cancer   31 pack year tobacco, quit in 2017    PLAN:  LDCT for f/u Pulmonary Nodule in August   Breo 200/25, albuterol PRN for asthma  See me in f/u for LDCT as above  Influenza vaccine today     Screening CT scan was considered in a lung cancer screening counseling and shared decision making visit today that included the following elements:   Eligibility: Age: 63. There are no signs or symptoms of lung cancer. Tobacco History 31 pack-years, quit 3 years ago  Verbal counseling has been performed by me to include benefits and harms of screening, follow-up diagnostic testing, over-diagnosis, false positive rate, and total radiation exposure;   I have counseled on the importance of adherence to annual lung cancer LDCT screening, the impact of comorbidities and patient is willing to undergo diagnosis and treatment;   I have provided counseling on the importance of maintaining cigarette smoking abstinence if former smoker; or the importance of smoking cessation if current smoker and, if appropriate, furnishing of information about tobacco cessation interventions; and   I have furnished a written order for lung cancer screening with LDCT.    Order for Screening

## 2020-04-09 ENCOUNTER — TELEPHONE (OUTPATIENT)
Dept: PULMONOLOGY | Age: 60
End: 2020-04-09

## 2020-06-29 ENCOUNTER — OFFICE VISIT (OUTPATIENT)
Dept: CARDIOLOGY CLINIC | Age: 60
End: 2020-06-29
Payer: MEDICARE

## 2020-06-29 ENCOUNTER — NURSE ONLY (OUTPATIENT)
Dept: CARDIOLOGY CLINIC | Age: 60
End: 2020-06-29
Payer: MEDICARE

## 2020-06-29 VITALS
DIASTOLIC BLOOD PRESSURE: 70 MMHG | HEIGHT: 64 IN | WEIGHT: 196 LBS | HEART RATE: 75 BPM | SYSTOLIC BLOOD PRESSURE: 144 MMHG | BODY MASS INDEX: 33.46 KG/M2 | OXYGEN SATURATION: 98 %

## 2020-06-29 PROBLEM — I42.8 NON-ISCHEMIC CARDIOMYOPATHY (HCC): Status: ACTIVE | Noted: 2020-06-29

## 2020-06-29 PROBLEM — I44.7 LBBB (LEFT BUNDLE BRANCH BLOCK): Status: ACTIVE | Noted: 2020-06-29

## 2020-06-29 PROCEDURE — 93290 INTERROG DEV EVAL ICPMS IP: CPT | Performed by: INTERNAL MEDICINE

## 2020-06-29 PROCEDURE — 93000 ELECTROCARDIOGRAM COMPLETE: CPT | Performed by: INTERNAL MEDICINE

## 2020-06-29 PROCEDURE — 93284 PRGRMG EVAL IMPLANTABLE DFB: CPT | Performed by: INTERNAL MEDICINE

## 2020-06-29 PROCEDURE — 99214 OFFICE O/P EST MOD 30 MIN: CPT | Performed by: INTERNAL MEDICINE

## 2020-08-03 ENCOUNTER — TELEPHONE (OUTPATIENT)
Dept: PULMONOLOGY | Age: 60
End: 2020-08-03

## 2020-08-03 NOTE — TELEPHONE ENCOUNTER
Within this Telehealth Consent, the terms you and yours refer to the person using the Telehealth Service (Service), or in the case of a use of the Service by or on behalf of a minor, you and yours refer to and include (i) the parent or legal guardian who provides consent to the use of the Service by such minor or uses the Service on behalf of such minor, and (ii) the minor for whom consent is being provided or on whose behalf the Service is being utilized. When using Service, you will be consulting with your health care providers via the use of Telehealth.   Telehealth involves the delivery of healthcare services using electronic communications, information technology or other means between a healthcare provider and a patient who are not in the same physical location. Telehealth may be used for diagnosis, treatment, follow-up and/or patient education, and may include, but is not limited to, one or more of the following:    Electronic transmission of medical records, photo images, personal health information or other data between a patient and a healthcare provider    Interactions between a patient and healthcare provider via audio, video and/or data communications    Use of output data from medical devices, sound and video files    Anticipated Benefits   The use of Telehealth by your Provider(s) through the Service may have the following possible benefits:    Making it easier and more efficient for you to access medical care and treatment for the conditions treated by such Provider(s) utilizing the Service    Allowing you to obtain medical care and treatment by Provider(s) at times that are convenient for you    Enabling you to interact with Provider(s) without the necessity of an in-office appointment     Possible Risks   While the use of Telehealth can provide potential benefits for you, there are also potential risks associated with the use of Telehealth.  These risks include, but may not be limited to the following:    Your Provider(s) may not able to provide medical treatment for your particular condition and you may be required to seek alternative healthcare or emergency care services.  The electronic systems or other security protocols or safeguards used in the Service could fail, causing a breach of privacy of your medical or other information.  Given regulatory requirements in certain jurisdictions, your Provider(s) diagnosis and/or treatment options, especially pertaining to certain prescriptions, may be limited. Acceptance   1. You understand that Services will be provided via Telehealth. This process involves the use of HIPAA compliant and secure, real-time audio-visual interfacing with a qualified and appropriately trained provider located at [facility]. 2. You understand that, under no circumstances, will this session be recorded. 3. You understand that the Provider(s) at [facility] and other clinical participants will be party to the information obtained during the Telehealth session in accordance with best medical practices. 4. You understand that the information obtained during the Telehealth session will be used to help determine the most appropriate treatment options. 5. You understand that You have the right to revoke this consent at any point in time. 6. You understand that Telehealth is voluntary, and that continued treatment is not dependent upon consent. 7. You understand that, in the event of non-consent to Telehealth services and/or technical difficulties, you will obtain services as typically provided in the absence of Telehealth technology. 8. You understand that this consent will be kept in Your medical record. 9. No potential benefits from the use of Telehealth or specific results can be guaranteed. Your condition may not be cured or improved and, in some cases, may get worse.    10. There are limitations in the provision of medical care and treatment via Telehealth and the Service and you may not be able to receive diagnosis and/or treatment through the Service for every condition for which you seek diagnosis and/or treatment. 11. There are potential risks to the use of Telehealth, including but not limited to the risks described in this Telehealth Consent. 12. Your Provider(s) have discussed the use of Telehealth and the Service with you, including the benefits and risks of such and you have provided oral consent to your Provider(s) for the use of Telehealth and the Service. 15. You understand that it is your duty to provide your Provider(s) truthful, accurate and complete information, including all relevant information regarding care that you may have received or may be receiving from other healthcare providers outside of the Service. 14. You understand that each of your Provider(s) may determine in his or sole discretion that your condition is not suitable for diagnosis and/or treatment using the Service, and that you may need to seek medical care and treatment a specialist or other healthcare provider, outside of the Service. 15. You understand that you are fully responsible for payment for all services provided by Provider(s) or through use of the Service and that you may not be able to use third-party insurance. 16. You represent that (a) you have read this Telehealth Consent carefully, (b) you understand the risks and benefits of the Service and the use of Telehealth in the medical care and treatment provided to you by Provider(s) using the Service, and (c) you have the legal capacity and authority to provide this consent for yourself and/or the minor for which you are consenting under applicable federal and state laws, including laws relating to the age of [de-identified] and/or parental/guardian consent.    17. You give your informed consent to the use of Telehealth by Provider(s) using the Service under the terms described in the Terms of Service and this Telehealth Consent. The patient was read the following statement and has consented to the visit as of 8/3/20. The patient has been scheduled for their first telehealth visit on 8/28/20 with Dr Vinay Carranza.

## 2020-08-10 ENCOUNTER — TELEPHONE (OUTPATIENT)
Dept: CARDIOLOGY CLINIC | Age: 60
End: 2020-08-10

## 2020-08-10 NOTE — TELEPHONE ENCOUNTER
Spoke with patient to ask if she could send a remote device check. She reported to this RN she does not have capabilities to send remote device check at home, that she only gets in-office device checks. Can you please advise? Thank you.

## 2020-08-10 NOTE — TELEPHONE ENCOUNTER
If she has no carelink home monitor she will have to come into office for a device check. If rep no available carelink express can be used to check device.

## 2020-08-11 NOTE — TELEPHONE ENCOUNTER
Spoke with patient. Verbalized to come to [facility] for in person device check. Verbalized understanding.

## 2020-08-12 ENCOUNTER — HOSPITAL ENCOUNTER (OUTPATIENT)
Dept: CT IMAGING | Age: 60
Discharge: HOME | End: 2020-08-12
Payer: MEDICARE

## 2020-08-12 ENCOUNTER — TELEPHONE (OUTPATIENT)
Dept: CARDIOLOGY CLINIC | Age: 60
End: 2020-08-12

## 2020-08-12 ENCOUNTER — NURSE ONLY (OUTPATIENT)
Dept: CARDIOLOGY CLINIC | Age: 60
End: 2020-08-12
Payer: MEDICARE

## 2020-08-12 PROCEDURE — 93284 PRGRMG EVAL IMPLANTABLE DFB: CPT | Performed by: INTERNAL MEDICINE

## 2020-08-12 PROCEDURE — G0297 LDCT FOR LUNG CA SCREEN: HCPCS

## 2020-08-12 NOTE — RESULT ENCOUNTER NOTE
Tell patient there are no new concerning findings on her CT scan.   We can discuss specifics when she sees me

## 2020-08-12 NOTE — PROGRESS NOTES
Device interrogation by company representative. See interrogation for further details. Replace Device RRT (12-Aug-2020)  (RRT on 08-Aug-2020)  She has a medtronic crt-d. From 2/12/14 w/ all 3 medtronic leads from 6/17/09. Pacing (% of Time Since 29-Jun-2020)  Total * 97.5 %    Last ov jmb 6/29/20-Replace ICD when [place]. Please call pt and schedule gen change. See PACEART report under Cardiology tab.

## 2020-08-14 NOTE — TELEPHONE ENCOUNTER
[place] been out of the [place]. Denies fever or lack of smell or taste. Is having asthma symptoms that are normal for her: mild SOB and cough since May. It's always this way every year due to her asthma.   Does she need COVID test?

## 2020-08-20 NOTE — TELEPHONE ENCOUNTER
Spoke with pt regarding mediations instructions. NPO at midnight, meds with a sip of water that morning.

## 2020-08-28 ENCOUNTER — VIRTUAL VISIT (OUTPATIENT)
Dept: PULMONOLOGY | Age: 60
End: 2020-08-28
Payer: MEDICARE

## 2020-08-28 PROCEDURE — 99213 OFFICE O/P EST LOW 20 MIN: CPT | Performed by: INTERNAL MEDICINE

## 2020-08-28 NOTE — PROGRESS NOTES
PULMONARY, CRITICAL CARE AND SLEEP MEDICINE     CC/REFERRING PROVIDER:  Patient is being seen at the request of Dr. Milena Hilton for a consultation for shortness of breath      Interval History:      doing well with Breo. Does not need every day. Wheezing has essentially resolved. Very rare use of albuterol other than prior to exercise that is more vigorous (walks hills). PRESENTING HPI: 64 yo WF with non ischemic cardiomyopathy, over the winter 2018 debilitating shortness of breath and cough, lost voice. Over the last few weeks, with central air much less coughing and breathing is better. Over the last few days, coughing more at night. No family history of asthma, but did have asthma as a child. Of note, she cannot take a single big breath without coughing, she cannot exercise at all without shortness of breath and cough. reports that she quit smoking about 5 years ago. She has a 32.67 pack-year smoking history. She has never used smokeless tobacco.  Just less than 1 ppd X 35 years       PHYSICAL EXAM:  There were no vitals taken for this visit.'  VIRTUAL  Constitutional:  No acute distress. Appears well developed and nourished. Eyes: EOM intact. Conjunctivae anicteric. No visible discharge. HENT: Head is normocephalic and atraumatic. Mucus membranes are moist and the tongue appears normal. Normal appearing nose. External Ears normal. Neck with no obvious mass and the trachea is midline. Respiratory: No accessory muscle usage. Respiratory effort normal. No visualized signs of difficulty breathing or respiratory distress  Cardiovascular: No obvious peripheral edema. Skin: No significant exanthematous lesions or discoloration noted on facial skin    Psychiatric: No anxiety or Agitation. Alert and Oriented to person, place and time. Normal judgement and insight.         DATA:  LDCT 8/12/20  Impression    Scattered noncalcified pulmonary nodules, similar to prior        PFT 10/31/18 FEV1 2.42 L 92% TLC 4.98 L 95%  DLCO 18.64 79%    ASSESSMENT:  Moderate persistent asthma -very well controlled    Not addressed today  Pulmonary Nodules   Non ischemic cardiomyopathy, f/b Dr. Jl Lyn, improved on f/u ECHO  Biventricular pacemaker  Family h/o lung cancer   31 pack year tobacco, quit in 2017    PLAN:  LDCT for f/u lung cancer screening in August 2021  Breo 200/25, albuterol PRN for asthma  See me in f/u for LDCT in August 2021       Screening CT scan was considered in a lung cancer screening counseling and shared decision making visit today that included the following elements:   Eligibility: Age: 62. There are no signs or symptoms of lung cancer. Tobacco History 31 pack-years, quit 3 years ago  Verbal counseling has been performed by me to include benefits and harms of screening, follow-up diagnostic testing, over-diagnosis, false positive rate, and total radiation exposure;   I have counseled on the importance of adherence to annual lung cancer LDCT screening, the impact of comorbidities and patient is willing to undergo diagnosis and treatment;   I have provided counseling on the importance of maintaining cigarette smoking abstinence if former smoker; or the importance of smoking cessation if current smoker and, if appropriate, furnishing of information about tobacco cessation interventions; and   I have furnished a written order for lung cancer screening with LDCT. Order for Screening chest CT scan should be placed with documentation as below:  Beneficiary date of birth; Actual pack - year smoking history (number) from above;   Current smoking status, and for former smokers, the number of years since quitting smoking from above  Beneficiary is asymptomatic   National Provider Identifier (NPI) for Severiano Engman [id number]    Jeffery Aceves is a 62 y.o. female being evaluated by a Virtual Visit (video visit) encounter to address concerns as mentioned above. A caregiver was present when appropriate.  Due to this being a TeleHealth encounter (During VRE-43 public health emergency), evaluation of the following organ systems was limited: Vitals/Constitutional/EENT/Resp/CV/GI//MS/Neuro/Skin/Heme-Lymph-Imm. Pursuant to the emergency declaration under the [address] [place] [address], [address] [place] [address] and the [employer] and [employer] Act, this Virtual Visit was conducted with patient's (and/or legal guardian's) consent, to reduce the patient's risk of exposure to COVID-19 and provide necessary medical care. The patient (and/or legal guardian) has also been advised to contact this office for worsening conditions or problems, and seek emergency medical treatment and/or call 911 if deemed necessary. Services were provided through a video synchronous discussion virtually to substitute for in-person clinic visit. Patient was located in her home, provider was located in his office. --Bobbi Schulz MD on 8/28/2020 at 9:38 AM    An electronic signature was used to authenticate this note.

## 2020-09-03 ENCOUNTER — HOSPITAL ENCOUNTER (OUTPATIENT)
Dept: CARDIAC CATH/INVASIVE PROCEDURES | Age: 60
Discharge: HOME | End: 2020-09-03
Attending: INTERNAL MEDICINE | Admitting: INTERNAL MEDICINE
Payer: MEDICARE

## 2020-09-03 VITALS — BODY MASS INDEX: 34.42 KG/M2 | WEIGHT: 201.6 LBS | HEIGHT: 64 IN

## 2020-09-03 LAB
A/G RATIO: 1.8 (ref 1.1–2.2)
ALBUMIN SERPL-MCNC: 4.5 G/DL (ref 3.4–5)
ALP BLD-CCNC: 83 U/L (ref 40–129)
ALT SERPL-CCNC: 42 U/L (ref 10–40)
ANION GAP SERPL CALCULATED.3IONS-SCNC: 11 MMOL/L (ref 3–16)
AST SERPL-CCNC: 28 U/L (ref 15–37)
BILIRUB SERPL-MCNC: 0.5 MG/DL (ref 0–1)
BUN BLDV-MCNC: 15 MG/DL (ref 7–20)
CALCIUM SERPL-MCNC: 9.9 MG/DL (ref 8.3–10.6)
CHLORIDE BLD-SCNC: 106 MMOL/L (ref 99–110)
CO2: 24 MMOL/L (ref 21–32)
CREAT SERPL-MCNC: 0.9 MG/DL (ref 0.6–1.1)
EKG ATRIAL RATE: 74 BPM
EKG DIAGNOSIS: NORMAL
EKG P AXIS: 56 DEGREES
EKG P-R INTERVAL: 138 MS
EKG Q-T INTERVAL: 486 MS
EKG QRS DURATION: 154 MS
EKG QTC CALCULATION (BAZETT): 539 MS
EKG R AXIS: -63 DEGREES
EKG T AXIS: 78 DEGREES
EKG VENTRICULAR RATE: 74 BPM
GFR AFRICAN AMERICAN: >60
GFR NON-AFRICAN AMERICAN: >60
GLOBULIN: 2.5 G/DL
GLUCOSE BLD-MCNC: 101 MG/DL (ref 70–99)
HCT VFR BLD CALC: 37 % (ref 36–48)
HEMOGLOBIN: 12.1 G/DL (ref 12–16)
INR BLD: 0.97 (ref 0.86–1.14)
MCH RBC QN AUTO: 32 PG (ref 26–34)
MCHC RBC AUTO-ENTMCNC: 32.8 G/DL (ref 31–36)
MCV RBC AUTO: 97.5 FL (ref 80–100)
PDW BLD-RTO: 13.2 % (ref 12.4–15.4)
PLATELET # BLD: 215 K/UL (ref 135–450)
PMV BLD AUTO: 7.9 FL (ref 5–10.5)
POTASSIUM SERPL-SCNC: 4.1 MMOL/L (ref 3.5–5.1)
PROTHROMBIN TIME: 11.2 SEC (ref 10–13.2)
RBC # BLD: 3.8 M/UL (ref 4–5.2)
SODIUM BLD-SCNC: 141 MMOL/L (ref 136–145)
TOTAL PROTEIN: 7 G/DL (ref 6.4–8.2)
WBC # BLD: 12.5 K/UL (ref 4–11)

## 2020-09-03 PROCEDURE — 80053 COMPREHEN METABOLIC PANEL: CPT

## 2020-09-03 PROCEDURE — C1882 AICD, OTHER THAN SING/DUAL: HCPCS

## 2020-09-03 PROCEDURE — 33264 RMVL & RPLCMT DFB GEN MLT LD: CPT

## 2020-09-03 PROCEDURE — 33264 RMVL & RPLCMT DFB GEN MLT LD: CPT | Performed by: INTERNAL MEDICINE

## 2020-09-03 PROCEDURE — 85610 PROTHROMBIN TIME: CPT

## 2020-09-03 PROCEDURE — 85027 COMPLETE CBC AUTOMATED: CPT

## 2020-09-03 PROCEDURE — 93005 ELECTROCARDIOGRAM TRACING: CPT | Performed by: INTERNAL MEDICINE

## 2020-09-03 RX ORDER — FENTANYL CITRATE 50 UG/ML
INJECTION, SOLUTION INTRAMUSCULAR; INTRAVENOUS
Status: COMPLETED | OUTPATIENT
Start: 2020-09-03 | End: 2020-09-03

## 2020-09-03 RX ORDER — MIDAZOLAM HYDROCHLORIDE 5 MG/ML
INJECTION INTRAMUSCULAR; INTRAVENOUS
Status: COMPLETED | OUTPATIENT
Start: 2020-09-03 | End: 2020-09-03

## 2020-09-03 RX ORDER — OMEPRAZOLE 20 MG/1
10 CAPSULE, DELAYED RELEASE ORAL DAILY
COMMUNITY

## 2020-09-03 RX ORDER — SODIUM CHLORIDE 9 MG/ML
INJECTION, SOLUTION INTRAVENOUS ONCE
Status: DISCONTINUED | OUTPATIENT
Start: 2020-09-03 | End: 2020-09-03 | Stop reason: HOSPADM

## 2020-09-03 RX ADMIN — MIDAZOLAM HYDROCHLORIDE 1 MG: 5 INJECTION INTRAMUSCULAR; INTRAVENOUS at 18:19

## 2020-09-03 RX ADMIN — MIDAZOLAM HYDROCHLORIDE 1 MG: 5 INJECTION INTRAMUSCULAR; INTRAVENOUS at 18:04

## 2020-09-03 RX ADMIN — MIDAZOLAM HYDROCHLORIDE 1 MG: 5 INJECTION INTRAMUSCULAR; INTRAVENOUS at 18:08

## 2020-09-03 RX ADMIN — FENTANYL CITRATE 50 MCG: 50 INJECTION, SOLUTION INTRAMUSCULAR; INTRAVENOUS at 18:19

## 2020-09-03 RX ADMIN — FENTANYL CITRATE 50 MCG: 50 INJECTION, SOLUTION INTRAMUSCULAR; INTRAVENOUS at 18:04

## 2020-09-03 RX ADMIN — FENTANYL CITRATE 50 MCG: 50 INJECTION, SOLUTION INTRAMUSCULAR; INTRAVENOUS at 18:08

## 2020-09-03 RX ADMIN — FENTANYL CITRATE 50 MCG: 50 INJECTION, SOLUTION INTRAMUSCULAR; INTRAVENOUS at 18:05

## 2020-09-03 RX ADMIN — MIDAZOLAM HYDROCHLORIDE 1 MG: 5 INJECTION INTRAMUSCULAR; INTRAVENOUS at 18:05

## 2020-09-03 NOTE — H&P
Referring Provider:  Alessio Holliday MD      Follow-up (no new cardiac complaints)           HPI:  Renetta Rodriguez is a 62 y.o. female who presents today referred by my colleague Dr Hillary Arreola for non ischemic cardiomyopathy and LBBB. She had a Bi-V ICD placed by Dr Roseann Aceves in  with generator replacement on 2014. Of note her LHC on 09 showed normal coronaries. Her most recent ECHO was 2018 and demonstrated an EF of 55%, mild MR and mild/mod AR. Her device check today demonstrates Ap 12%,  96%, 3 months of battery life, 0 AF or VT. She is taking her medications as prescribed. She denies any cardiac complaints.      Past Medical History:   has a past medical history of BIPOLAR, Cardiomyopathy ([address] CHF (congestive heart failure) ([address] Fibromyalgia, GERD (gastroesophageal reflux disease), Hyperlipidemia, Hypertension, Palpitations, Plantar fasciitis, TORN MENISCUS BOTH KNEES, and Vitamin D deficiency.     Surgical History:   has a past surgical history that includes Pacemaker insertion; Tonsillectomy;  section; Neck surgery; Breast enhancement surgery; Colonoscopy; Endoscopy, colon, diagnostic; and knee surgery (10/2011).    Social History:   reports that she quit smoking about 5 years ago. She has a 32.67 pack-year smoking history.  She has never used smokeless tobacco. She reports that she does not drink alcohol or use drugs.      Family History:  family history includes Diabetes in her brother; Heart Disease in her father; High Blood Pressure in her brother and father; Kidney Disease in her mother; Stroke in her father.      Home Medications:  Encounter Medications          Outpatient Encounter Medications as of 2020   Medication Sig Dispense Refill    fluticasone-salmeterol (ADVAIR DISKUS) 250-50 MCG/DOSE AEPB Inhale 1 puff into the lungs 2 times daily 1 each 5    atorvastatin (LIPITOR) 20 MG tablet TAKE ONE TABLET BY MOUTH DAILY 90 tablet 3    amLODIPine (NORVASC) 2.5 MG tablet Take 2.5 mg by mouth daily        meclizine (ANTIVERT) 12.5 MG tablet Take 12.5 mg by mouth 2 times daily        topiramate (TOPAMAX) 100 MG tablet Take 100 mg by mouth daily        irbesartan (AVAPRO) 300 MG tablet Take 1 tablet by mouth nightly 90 tablet 3    diclofenac (VOLTAREN) 75 MG EC tablet Take 1 tablet by mouth 2 times daily for 5 days 10 tablet 0    albuterol sulfate HFA (PROVENTIL HFA) 108 (90 Base) MCG/ACT inhaler Inhale 2 puffs into the lungs every 4 hours as needed for Wheezing or Shortness of Breath 1 Inhaler 5    carvedilol (COREG) 25 MG tablet Take 1 tablet by mouth 2 times daily (with meals) 180 tablet 4    levothyroxine (SYNTHROID) 25 MCG tablet Take 25 mcg by mouth Daily.          No facility-administered encounter medications on file as of 6/29/2020.            Allergies:  Tetracyclines & related; Erythromycin base; Lisinopril; Nsaids; and Trazodone      Review of Systems   Constitutional: Negative. HENT: Negative. Eyes: Negative. Respiratory: Negative. Cardiovascular: Negative. Gastrointestinal: Negative. Genitourinary: Negative. Musculoskeletal: Negative. Skin: Negative. Neurological: Negative. Hematological: Negative. Psychiatric/Behavioral: Negative.     BP (!) 144/70   Pulse 75   Ht 5' 4\" (1.626 m)   Wt 196 lb (88.9 kg)   SpO2 98%   BMI 33.64 kg/m²         Objective:  Physical Exam   Constitutional: She is oriented to person, place, and time. She appears well-developed and well-nourished. HENT:   Head: Normocephalic and atraumatic. Eyes: Pupils are equal, round, and reactive to light. Neck: Normal range of motion. Cardiovascular: Normal rate, regular rhythm and normal heart sounds. Pulmonary/Chest: Effort normal and breath sounds normal.   Abdominal: Soft. No tenderness. Musculoskeletal: Normal range of motion. She exhibits no edema. Neurological: She is alert and oriented to person, place, and time.    Skin: Skin is warm and dry. Psychiatric: She has a normal mood and affect.      Assessment:  1. NICM              ~ normal function of Bi-V ICD. 6/29/20 device interrogation demonstrates 3 months until RRT. Device replacement procedure reviewed with the pt. All questions addressed. 2. LBBB  3. HTN        Plan:  1. Continue current medication regimen  2. Replace ICD when [facility].    3.  Device check in 3 months            Jin Min M.D.

## 2020-09-04 NOTE — PROCEDURES
[address] [address]                            CARDIAC CATHETERIZATION    PATIENT NAME: Jonny Lewis                :        1960  MED REC NO:   [id number]                          ROOM:  ACCOUNT NO:   [de-identified]                           ADMIT DATE: 2020  PROVIDER:     Erika Reyes MD    DATE OF PROCEDURE:  2020    CARDIAC ELECTROPHYSIOLOGY PROCEDURE NOTE    PROCEDURES:  1. Removal of biventricular ICD generator. 2.  Implantation of biventricular ICD generator. INDICATIONS:  ICD electrode replacement time. FINDINGS:  1. Right atrial sensing 5.8 millivolts. 2.  Right atrial pacing threshold 0.4 volts at 0.5 milliseconds. 3.  Right ventricular sensing 22.7 millivolts. 4.  Right ventricular pacing threshold 1.4 volts at 0.5 milliseconds. 5.  Left ventricular sensing 4.9 millivolts. 6.  Left ventricular pacing threshold 1 volt at 0.5 milliseconds. PROCEDURE DESCRIPTION:  After informed consent was obtained, the patient  was brought to the Cardiac Electrophysiology Laboratory in a fasting  state on 2020. She was prepared for the procedure by application  of ECG electrodes and an automated blood pressure cuff. Pacing and  defibrillation patches were applied to the chest in the  anterior-posterior orientation. The left upper chest was shaved and  prepared with an antibacterial soap, and the patient was draped in a  sterile fashion. She received IV midazolam and IV fentanyl for  sedation. After adequate sedation was achieved, local anesthetic was  infiltrated into the skin overlying the previous incision. The previous  incision was opened with sharp dissection. This incision was carried  down to the level of the ICD capsule using local anesthetic and  electrocautery.   The capsule was incised, and the preexisting ICD was  delivered from the pocket and removed from the leads.  The preexisting  ICD is Medtronic model MTSR2J0, serial number [id number]. The atrial  lead is Medtronic model [id number], serial number [id number]. The P-wave is  sensed to 5.8 millivolts. The atrial pacing threshold is 0.4 volts at  0.5 milliseconds. Pacing impedance is 414 ohms. Preexisting right  ventricular lead is Medtronic model [id number], serial number [id number]. The  R-wave is sensed to 22.7 millivolts. The ventricular pacing threshold  is 1.4 volts at 0.5 milliseconds. Pacing impedance is 555 ohms. The  left ventricular lead is Medtronic model [id number], serial number [id number]. The R-wave was sensed to 4.9 millivolts. The ventricular pacing  threshold is 1 volts at 0.5 milliseconds. Pace impedance is 415 ohms. All leads were felt to be in acceptable condition. These were inserted  into the appropriate receptacles in the header of the new device. The  new device is Medtronic model [id number], serial number [id number]. The  right atrial, right ventricular, and left ventricular leads as well as  high voltage leads inserted in the appropriate receptacles and the  setscrews were tightened appropriately. The patient was observed to  have atrial synchronous biventricular pacing at that time. The device  was placed in an antimicrobial pouch and returned to the subcutaneous  pocket. The subcutaneous tissues were closed in an interrupted fashion  using 3-0 Vicryl sutures. The skin was closed in a subcuticular fashion  using 4-0 Vicryl sutures. Wound edges were approximated with  Steri-Strips and the wound was covered with a dry sterile dressing. The patient tolerated the procedure well. There were no apparent  complications. All sponge and needle counts were reported as correct at  the termination of the case. EBL < 10 cc.         Aspen Dennison MD    D: 09/03/2020 18:56:36       T: 09/03/2020 22:06:14     [email or web address]  Job#: [id number]     Doc#: [id number]    CC:

## 2020-09-10 ENCOUNTER — NURSE ONLY (OUTPATIENT)
Dept: CARDIOLOGY CLINIC | Age: 60
End: 2020-09-10
Payer: MEDICARE

## 2020-09-10 PROCEDURE — 93284 PRGRMG EVAL IMPLANTABLE DFB: CPT | Performed by: INTERNAL MEDICINE

## 2020-09-10 NOTE — PROGRESS NOTES
Patient presents to the device clinic today for a programming evaluation for her defibrillator. Patient has a history of NICM. Takes Coreg. Patient underwent a generator change on 9/3 by Dr. Dior January. Since then, no arrhythmias recorded. AP <.2%  98.6%  Effective 98.6%  All sensing and pacing parameters are within normal range. No changes need to be made at this time. Incision is clean and dry with all dressings removed and site left open to the air. Patient education was provided about site care, device functionality, in home monitoring, and any other patient questions and/or concerns were addressed. Aftercare and remote monitoring literature was provided. Patient voices understanding. Please see interrogation for more detail. Patient will follow up in 3 months in office or remotely. See Paceart report under the Cardiology tab.

## 2020-09-24 NOTE — SEDATION DOCUMENTATION
Sedation start time: 1755  Sedation stop time: 1833  Mallampati: 2  Pre and post Jac score: 10/10  Medication given: IV Versed, IV fentanyl  Pre-Procedure Assessment / Plan:  ASA: Class 2 - A normal healthy patient with mild systemic disease  Level of Sedation Plan: Moderate sedation  Post Procedure plan: Return to same level of care

## 2020-11-04 RX ORDER — ATORVASTATIN CALCIUM 20 MG/1
TABLET, FILM COATED ORAL
Qty: 30 TABLET | Refills: 0 | Status: SHIPPED | OUTPATIENT
Start: 2020-11-04 | End: 2020-12-08

## 2020-11-24 ENCOUNTER — PROCEDURE VISIT (OUTPATIENT)
Dept: CARDIOLOGY CLINIC | Age: 60
End: 2020-11-24
Payer: MEDICARE

## 2020-11-24 ENCOUNTER — OFFICE VISIT (OUTPATIENT)
Dept: CARDIOLOGY CLINIC | Age: 60
End: 2020-11-24
Payer: MEDICARE

## 2020-11-24 VITALS
HEART RATE: 103 BPM | BODY MASS INDEX: 34.35 KG/M2 | HEIGHT: 64 IN | SYSTOLIC BLOOD PRESSURE: 184 MMHG | OXYGEN SATURATION: 98 % | DIASTOLIC BLOOD PRESSURE: 90 MMHG | WEIGHT: 201.2 LBS

## 2020-11-24 PROCEDURE — 99214 OFFICE O/P EST MOD 30 MIN: CPT | Performed by: INTERNAL MEDICINE

## 2020-11-24 PROCEDURE — 93290 INTERROG DEV EVAL ICPMS IP: CPT | Performed by: INTERNAL MEDICINE

## 2020-11-24 RX ORDER — CARVEDILOL 25 MG/1
25 TABLET ORAL 2 TIMES DAILY WITH MEALS
Qty: 180 TABLET | Refills: 3 | Status: SHIPPED | OUTPATIENT
Start: 2020-11-24 | End: 2022-05-17 | Stop reason: SDUPTHER

## 2020-11-24 RX ORDER — LISINOPRIL 20 MG/1
20 TABLET ORAL DAILY
Qty: 90 TABLET | Refills: 3 | Status: CANCELLED | OUTPATIENT
Start: 2020-11-24

## 2020-11-24 RX ORDER — LISINOPRIL 20 MG/1
20 TABLET ORAL DAILY
COMMUNITY
End: 2020-11-24

## 2020-11-24 RX ORDER — VALSARTAN 320 MG/1
320 TABLET ORAL DAILY
Qty: 90 TABLET | Refills: 3 | Status: SHIPPED | OUTPATIENT
Start: 2020-11-24

## 2020-11-24 RX ORDER — IRBESARTAN 300 MG/1
300 TABLET ORAL NIGHTLY
Qty: 90 TABLET | Refills: 3 | Status: CANCELLED | OUTPATIENT
Start: 2020-11-24

## 2020-11-24 RX ORDER — AMLODIPINE BESYLATE 5 MG/1
5 TABLET ORAL 2 TIMES DAILY
Qty: 180 TABLET | Refills: 3 | Status: SHIPPED | OUTPATIENT
Start: 2020-11-24

## 2020-11-24 NOTE — PATIENT INSTRUCTIONS
Plan:  1. Increase Amlodipine to 5 mg twice daily. 2. Stop Irbesartan and Lisinopril. 3. Restart Valsartan 320 mg daily for better blood pressure control. 4. Echocardiogram toe valuate heart function and structure. Get this after the new year. 5. Labs: TSH/free T4, CBC, Lipid, CMP, BNP. 6. Follow up with me in 1 year. Your provider has ordered testing for further evaluation. An order/prescription has been included in your paper work.  To schedule outpatient testing, contact Central Scheduling by calling [address] [place] [address] ([phone]).

## 2020-11-24 NOTE — LETTER
error    [address]   Advanced Heart Failure/Pulmonary Hypertension  Cardiac Follow up       Jesús Nicole  YOB: 1960    Date of Visit:  11/24/20    Chief Complaint   Patient presents with    Follow-up    Congestive Heart Failure         History of Present Illness:  Ms Curt Hinkle comes to the office for follow up of her nonischemic cardiomyopathy with LBBB. She had a RHC 1/7/09 which showed normal coronaries and normal cardiac output. She was told years ago that her EF was 20-25%. 8/08 she had an angiogram that showed normal coronaries and EF 30%. However, MUGA scan revealed EF 61%. June, 2009 she had placement of bi-v defibrillator, and has had no shocks. Echo 10/09 showed EF 40-45% and decrease in left ventricular size. She had an echo today that showed EF 50%. She has been granted disability for her heart and has been out of work. As part of her history was told she has a slight underactive thyroid. Her echo from 3/23/17 shows an EF of 50-55%. Her device check from 3/23/17 shows normal device sensing and function with normal fluid level on her Optivol. She lost her mother to lung cancer in September 2017. Her echo from 04/18/18 showed her EF was 55%. Mild MR. Mild to moderate AR. She underwent a generator change on 80 with Dr. Marisela Murphy. Today she reports that she has been feeling well from a cardiac standpoint. She admits to gaining nearly 40 pounds due to the current health pandemics restrictions. Her optivol today shows normal fluid levels. She has been taking her medications as prescribed without side effects. She has been taking Lisinopril and Irbesartan both for unknown reasons. Her blood pressure is noted to be elevated today. She reports she took her medication this morning and does not feel that the Irbesartan is as effective as the Valsartan was.  Patient currently denies any weight gain, edema, palpitations, chest pain, shortness of breath, dizziness, and syncope. Allergies   Allergen Reactions    Tetracyclines & Related Nausea And Vomiting    Erythromycin Base      nausea    Lisinopril      Flu like symptoms    Nsaids Nausea Only    Trazodone Other (See Comments)     Hyperactivity   jitteriness     Current Outpatient Medications   Medication Sig Dispense Refill    atorvastatin (LIPITOR) 20 MG tablet TAKE ONE TABLET BY MOUTH DAILY 30 tablet 0    omeprazole (PRILOSEC) 20 MG delayed release capsule Take 20 mg by mouth daily      fluticasone-salmeterol (ADVAIR DISKUS) 250-50 MCG/DOSE AEPB Inhale 1 puff into the lungs 2 times daily 1 each 5    amLODIPine (NORVASC) 2.5 MG tablet Take 2.5 mg by mouth daily      meclizine (ANTIVERT) 12.5 MG tablet Take 12.5 mg by mouth 2 times daily      topiramate (TOPAMAX) 100 MG tablet Take 100 mg by mouth daily      irbesartan (AVAPRO) 300 MG tablet Take 1 tablet by mouth nightly 90 tablet 3    albuterol sulfate HFA (PROVENTIL HFA) 108 (90 Base) MCG/ACT inhaler Inhale 2 puffs into the lungs every 4 hours as needed for Wheezing or Shortness of Breath 1 Inhaler 5    carvedilol (COREG) 25 MG tablet Take 1 tablet by mouth 2 times daily (with meals) 180 tablet 4    levothyroxine (SYNTHROID) 25 MCG tablet Take 25 mcg by mouth Daily. No current facility-administered medications for this visit.         Immunization History   Administered Date(s) Administered    Influenza Vaccine, unspecified formulation 09/14/2018    Influenza, Quadv, IM, (6 mo and older Fluzone, Flulaval, Fluarix and 3 yrs and older Afluria) 09/12/2008, 10/31/2018    Pneumococcal Polysaccharide ([id number]) 09/08/2011, 09/14/2018    Tdap (Boostrix, Adacel) 08/01/2016       Patient Active Problem List   Diagnosis    Hypertension    Hyperlipidemia    Palpitations    Systolic [facility], chronic ([facility] [address]    Medtronic CRT-D w/ optivol-Gen change 2/12/14 and 9/3/20 (leads 6/17/09)    SOB (shortness of breath)    Non-ischemic cardiomyopathy ([address]  LBBB (left bundle branch block)    ICD (implantable cardioverter-defibrillator) battery depletion       Past Medical History:   Diagnosis Date    BIPOLAR     Cardiomyopathy ([address]     UNKNOWN CAUSE    CHF (congestive heart failure) (HCC)     Fibromyalgia     GERD (gastroesophageal reflux disease)     Hyperlipidemia     Hypertension     Palpitations     Plantar fasciitis left lower extremity    TORN MENISCUS BOTH KNEES     Vitamin D deficiency      Past Surgical History:   Procedure Laterality Date    BREAST ENHANCEMENT SURGERY      CARDIAC DEFIBRILLATOR PLACEMENT Left 2009    Medtronic BiV-ICD     SECTION      x3    COLONOSCOPY      ENDOSCOPY, COLON, DIAGNOSTIC      KNEE SURGERY  10/2011    Left Maniscus    NECK SURGERY      FUSION C5/6 WITH CADAVAR BONES 2002    PACEMAKER PLACEMENT  2020    Generator change- ICD    PACEMAKER PLACEMENT  2014    Bi-V ICD Generator change    TONSILLECTOMY       Family History   Problem Relation Age of Onset    Kidney Disease Mother     Stroke Father     High Blood Pressure Father     Heart Disease Father     High Blood Pressure Brother     Diabetes Brother      Social History     Socioeconomic History    Marital status:      Spouse name: Not on file    Number of children: Not on file    Years of education: Not on file    Highest education level: Not on file   Occupational History    Not on file   Social Needs    Financial resource strain: Not on file    Food insecurity     Worry: Not on file     Inability: Not on file    Transportation needs     Medical: Not on file     Non-medical: Not on file   Tobacco Use    Smoking status: Former Smoker     Packs/day: 0.99     Years: 33.00     Pack years: 32.67     Last attempt to quit: 2014     Years since quittin.9    Smokeless tobacco: Never Used   Substance and Sexual Activity    Alcohol use: No    Drug use: No    Sexual activity: Not Currently   Lifestyle  Physical activity     Days per week: Not on file     Minutes per session: Not on file    Stress: Not on file   Relationships    Social connections     Talks on phone: Not on file     Gets together: Not on file     Attends [employer] service: Not on file     Active member of club or organization: Not on file     Attends meetings of clubs or organizations: Not on file     Relationship status: Not on file    Intimate partner violence     Fear of current or ex partner: Not on file     Emotionally abused: Not on file     Physically abused: Not on file     Forced sexual activity: Not on file   Other Topics Concern    Not on file   Social History Narrative    Not on file       Review of Systems:   · Constitutional: there has been no unanticipated weight loss. There's been no change in energy level, sleep pattern, or activity level. · Eyes: No visual changes or diplopia. No scleral icterus. · ENT: No Headaches, hearing loss or vertigo. No mouth sores or sore throat. · Cardiovascular: No chest pain or palpitations but worsening edema. · Respiratory: SOL worsening   · Gastrointestinal: No abdominal pain, appetite loss, blood in stools. No change in bowel or bladder habits. · Genitourinary: No dysuria, trouble voiding, or hematuria. · Musculoskeletal:  No gait disturbance, weakness or joint complaints. · Integumentary: No rash or pruritis. · Neurological: No headache, diplopia, change in muscle strength, numbness or tingling. No change in gait, balance, coordination, mood, affect, memory, mentation, behavior. · Psychiatric: No anxiety, no depression. · Endocrine: No malaise, fatigue or temperature intolerance. No excessive thirst, fluid intake, or urination. No tremor. · Hematologic/Lymphatic: No abnormal bruising or bleeding, blood clots or swollen lymph nodes. · Allergic/Immunologic: No nasal congestion or hives.     Physical Examination:    Vitals:    11/24/20 1329 11/24/20 1336 BP: (!) 184/92 (!) 184/90   Pulse: 103    SpO2: 98%    Weight: 201 lb 3.2 oz (91.3 kg)    Height: 5' 4\" (1.626 m)      Body mass index is 34.54 kg/m². Wt Readings from Last 3 Encounters:   11/24/20 201 lb 3.2 oz (91.3 kg)   09/03/20 201 lb 9.6 oz (91.4 kg)   06/29/20 196 lb (88.9 kg)     BP Readings from Last 3 Encounters:   11/24/20 (!) 184/90   06/29/20 (!) 144/70   02/10/20 (!) 140/82          Constitutional and General Appearance:   WD/WN in NAD, pale appearing  HEENT:  NC/AT  Respiratory:  · Normal excursion and expansion without use of accessory muscles  · Resp Auscultation: Normal breath sounds without dullness  Cardiovascular:  · The apical impulses not displaced  · Heart tones are crisp and normal  · Cervical veins are not engorged  · The carotid upstroke is normal in amplitude and contour without delay or bruit  · JVP 9-10 cm H2O  RRR with nl S1 and S2 without m,r,g  · Peripheral pulses are symmetrical and full  · There is no clubbing, cyanosis of the extremities. · Pitting 2+ edema bilaterally from knees down.     · Femoral Arteries: 2+ and equal  · Pedal Pulses: 2+ and equal   Abdomen:  · No masses or tenderness  · Liver/Spleen: No Abnormalities Noted  Neurological/Psychiatric:  · Alert and oriented in all spheres  · Moves all extremities well  · Exhibits normal gait balance and coordination  · No abnormalities of mood, affect, memory, mentation, or behavior are noted  ·   Lab Results   Component Value Date     09/03/2020     10/23/2019     09/03/2018    K 4.1 09/03/2020    K 4.6 10/23/2019    K 3.8 09/03/2018    BUN 15 09/03/2020    BUN 17 10/23/2019    BUN 8 09/03/2018    CREATININE 0.9 09/03/2020    CREATININE 1.0 10/23/2019    CREATININE 0.8 09/03/2018    GLUCOSE 101 09/03/2020    GLUCOSE 122 10/23/2019     Lab Results   Component Value Date    BNP 12 09/14/2018    BNP 37 09/07/2011    BNP 39 05/31/2011     Lab Results   Component Value Date    ALT 42 (H) 09/03/2020

## 2020-11-24 NOTE — PROGRESS NOTES
[address]   Advanced Heart Failure/Pulmonary Hypertension  Cardiac Follow up       Josy Berumen  YOB: 1960    Date of Visit:  11/24/20    Chief Complaint   Patient presents with    Follow-up    Congestive Heart Failure         History of Present Illness:  Ms Glenis Mariscal comes to the office for follow up of her nonischemic cardiomyopathy with LBBB. She had a RHC 1/7/09 which showed normal coronaries and normal cardiac output. She was told years ago that her EF was 20-25%. 8/08 she had an angiogram that showed normal coronaries and EF 30%. However, MUGA scan revealed EF 61%. June, 2009 she had placement of bi-v defibrillator, and has had no shocks. Echo 10/09 showed EF 40-45% and decrease in left ventricular size. She had an echo today that showed EF 50%. She has been granted disability for her heart and has been out of work. As part of her history was told she has a slight underactive thyroid. Her echo from 3/23/17 shows an EF of 50-55%. Her device check from 3/23/17 shows normal device sensing and function with normal fluid level on her Optivol. She lost her mother to lung cancer in September 2017. Her echo from 04/18/18 showed her EF was 55%. Mild MR. Mild to moderate AR. She underwent a generator change on 80 with Dr. Milagros Ochoa. Today she reports that she has been feeling well from a cardiac standpoint. She admits to gaining nearly 40 pounds due to the current health pandemics restrictions. Her optivol today shows normal fluid levels. She has been taking her medications as prescribed without side effects. She has been taking Lisinopril and Irbesartan both for unknown reasons. Her blood pressure is noted to be elevated today. She reports she took her medication this morning and does not feel that the Irbesartan is as effective as the Valsartan was. Patient currently denies any weight gain, edema, palpitations, chest pain, shortness of breath, dizziness, and syncope. Allergies   Allergen Reactions    Tetracyclines & Related Nausea And Vomiting    Erythromycin Base      nausea    Lisinopril      Flu like symptoms    Nsaids Nausea Only    Trazodone Other (See Comments)     Hyperactivity   jitteriness     Current Outpatient Medications   Medication Sig Dispense Refill    atorvastatin (LIPITOR) 20 MG tablet TAKE ONE TABLET BY MOUTH DAILY 30 tablet 0    omeprazole (PRILOSEC) 20 MG delayed release capsule Take 20 mg by mouth daily      fluticasone-salmeterol (ADVAIR DISKUS) 250-50 MCG/DOSE AEPB Inhale 1 puff into the lungs 2 times daily 1 each 5    amLODIPine (NORVASC) 2.5 MG tablet Take 2.5 mg by mouth daily      meclizine (ANTIVERT) 12.5 MG tablet Take 12.5 mg by mouth 2 times daily      topiramate (TOPAMAX) 100 MG tablet Take 100 mg by mouth daily      irbesartan (AVAPRO) 300 MG tablet Take 1 tablet by mouth nightly 90 tablet 3    albuterol sulfate HFA (PROVENTIL HFA) 108 (90 Base) MCG/ACT inhaler Inhale 2 puffs into the lungs every 4 hours as needed for Wheezing or Shortness of Breath 1 Inhaler 5    carvedilol (COREG) 25 MG tablet Take 1 tablet by mouth 2 times daily (with meals) 180 tablet 4    levothyroxine (SYNTHROID) 25 MCG tablet Take 25 mcg by mouth Daily. No current facility-administered medications for this visit.         Immunization History   Administered Date(s) Administered    Influenza Vaccine, unspecified formulation 09/14/2018    Influenza, Quadv, IM, (6 mo and older Fluzone, Flulaval, Fluarix and 3 yrs and older Afluria) 09/12/2008, 10/31/2018    Pneumococcal Polysaccharide ([id number]) 09/08/2011, 09/14/2018    Tdap (Boostrix, Adacel) 08/01/2016       Patient Active Problem List   Diagnosis    Hypertension    Hyperlipidemia    Palpitations    [facility]stolic [facility], chronic ([facility] [address]    Medtronic CRT-D w/ optivol-Gen change 2/12/14 and 9/3/20 (leads 6/17/09)    SOB (shortness of breath)    Non-ischemic cardiomyopathy (HCC)    LBBB (left bundle branch block)    ICD (implantable cardioverter-defibrillator) battery depletion       Past Medical History:   Diagnosis Date    BIPOLAR     Cardiomyopathy ([facility] [address]     UNKNOWN CAUSE    CHF (congestive heart failure) (HCC)     Fibromyalgia     GERD (gastroesophageal reflux disease)     Hyperlipidemia     Hypertension     Palpitations     Plantar fasciitis left lower extremity    TORN MENISCUS BOTH KNEES     Vitamin D deficiency      Past Surgical History:   Procedure Laterality Date    BREAST ENHANCEMENT SURGERY      CARDIAC DEFIBRILLATOR PLACEMENT Left 2009    Medtronic BiV-ICD     SECTION      x3    COLONOSCOPY      ENDOSCOPY, COLON, DIAGNOSTIC      KNEE SURGERY  10/2011    Left Maniscus    NECK SURGERY      FUSION C5/6 WITH CADAVAR BONES 2002    PACEMAKER PLACEMENT  2020    Generator change- ICD    PACEMAKER PLACEMENT  2014    Bi-V ICD Generator change    TONSILLECTOMY       Family History   Problem Relation Age of Onset    Kidney Disease Mother     Stroke Father     High Blood Pressure Father     Heart Disease Father     High Blood Pressure Brother     Diabetes Brother      Social History     Socioeconomic History    Marital status:      Spouse name: Not on file    Number of children: Not on file    Years of education: Not on file    Highest education level: Not on file   Occupational History    Not on file   Social Needs    Financial resource strain: Not on file    Food insecurity     Worry: Not on file     Inability: Not on file    Transportation needs     Medical: Not on file     Non-medical: Not on file   Tobacco Use    Smoking status: Former Smoker     Packs/day: 0.99     Years: 33.00     Pack years: 32.67     Last attempt to quit: 2014     Years since quittin.9    Smokeless tobacco: Never Used   Substance and Sexual Activity    Alcohol use: No    Drug use: No    Sexual activity: Not Currently   Lifestyle    Physical activity     Days per week: Not on file     Minutes per session: Not on file    Stress: Not on file   Relationships    Social connections     Talks on phone: Not on file     Gets together: Not on file     Attends Faith service: Not on file     Active member of club or organization: Not on file     Attends meetings of clubs or organizations: Not on file     Relationship status: Not on file    Intimate partner violence     Fear of current or ex partner: Not on file     Emotionally abused: Not on file     Physically abused: Not on file     Forced sexual activity: Not on file   Other Topics Concern    Not on file   Social History Narrative    Not on file       Review of Systems:   · Constitutional: there has been no unanticipated weight loss. There's been no change in energy level, sleep pattern, or activity level. · Eyes: No visual changes or diplopia. No scleral icterus. · ENT: No Headaches, hearing loss or vertigo. No mouth sores or sore throat. · Cardiovascular: No chest pain or palpitations but worsening edema. · Respiratory: SOL worsening   · Gastrointestinal: No abdominal pain, appetite loss, blood in stools. No change in bowel or bladder habits. · Genitourinary: No dysuria, trouble voiding, or hematuria. · Musculoskeletal:  No gait disturbance, weakness or joint complaints. · Integumentary: No rash or pruritis. · Neurological: No headache, diplopia, change in muscle strength, numbness or tingling. No change in gait, balance, coordination, mood, affect, memory, mentation, behavior. · Psychiatric: No anxiety, no depression. · Endocrine: No malaise, fatigue or temperature intolerance. No excessive thirst, fluid intake, or urination. No tremor. · Hematologic/Lymphatic: No abnormal bruising or bleeding, blood clots or swollen lymph nodes. · Allergic/Immunologic: No nasal congestion or hives.     Physical Examination:    Vitals:    11/24/20 1329 11/24/20 1336   BP: (!) 184/92 (!) 184/90 Pulse: 103    SpO2: 98%    Weight: 201 lb 3.2 oz (91.3 kg)    Height: 5' 4\" (1.626 m)      Body mass index is 34.54 kg/m². Wt Readings from Last 3 Encounters:   11/24/20 201 lb 3.2 oz (91.3 kg)   09/03/20 201 lb 9.6 oz (91.4 kg)   06/29/20 196 lb (88.9 kg)     BP Readings from Last 3 Encounters:   11/24/20 (!) 184/90   06/29/20 (!) 144/70   02/10/20 (!) 140/82          Constitutional and General Appearance:   WD/WN in NAD, pale appearing  HEENT:  NC/AT  Respiratory:  · Normal excursion and expansion without use of accessory muscles  · Resp Auscultation: Normal breath sounds without dullness  Cardiovascular:  · The apical impulses not displaced  · Heart tones are crisp and normal  · Cervical veins are not engorged  · The carotid upstroke is normal in amplitude and contour without delay or bruit  · JVP 9-10 cm H2O  RRR with nl S1 and S2 without m,r,g  · Peripheral pulses are symmetrical and full  · There is no clubbing, cyanosis of the extremities. · Pitting 2+ edema bilaterally from knees down.     · Femoral Arteries: 2+ and equal  · Pedal Pulses: 2+ and equal   Abdomen:  · No masses or tenderness  · Liver/Spleen: No Abnormalities Noted  Neurological/Psychiatric:  · Alert and oriented in all spheres  · Moves all extremities well  · Exhibits normal gait balance and coordination  · No abnormalities of mood, affect, memory, mentation, or behavior are noted  ·   Lab Results   Component Value Date     09/03/2020     10/23/2019     09/03/2018    K 4.1 09/03/2020    K 4.6 10/23/2019    K 3.8 09/03/2018    BUN 15 09/03/2020    BUN 17 10/23/2019    BUN 8 09/03/2018    CREATININE 0.9 09/03/2020    CREATININE 1.0 10/23/2019    CREATININE 0.8 09/03/2018    GLUCOSE 101 09/03/2020    GLUCOSE 122 10/23/2019     Lab Results   Component Value Date    BNP 12 09/14/2018    BNP 37 09/07/2011    BNP 39 05/31/2011     Lab Results   Component Value Date    ALT 42 (H) 09/03/2020    ALT 23 10/23/2019    AST 28 09/03/2020    AST 21 10/23/2019     Lab Results   Component Value Date    HGB 12.1 09/03/2020    HGB 14.5 10/23/2019    HCT 37.0 09/03/2020    HCT 43.4 10/23/2019     09/03/2020     10/23/2019     Lab Results   Component Value Date    TRIG 181 10/23/2019    TRIG 65 09/14/2018    HDL 91 10/23/2019    HDL 53 09/14/2018    HDL 59 05/31/2011    HDL 53 02/08/2010    LDLCALC 101 10/23/2019    LDLCALC 94 09/14/2018      Echo 3/23/17   Low normal left ventricle systolic function with an estimated ejection   fraction of 50%. EF by Cornejo's method is 51%. There is hypokinesis of the inferoseptal and apical inferior walls. Grade I diastolic dysfunction with normal filing pressure. Mild mitral regurgitation. Mild to moderate aortic regurgitation. Systolic pulmonary artery pressure (SPAP) is normal and estimated at 20 mmHg   (RA pressure 3 mmHg). Echo: 04/18/18  Summary   Normal systolic function with an estimated ejection fraction of 55%. No regional wall motion abnormalities are seen. Normal left ventricular diastolic filling pressure. Mild mitral regurgitation. Mild to moderate aortic regurgitation. Systolic pulmonary artery pressure (SPAP) is normal and estimated at 21 mmHg   (RA pressure 3 mmHg). Last echo on 3/23/2017 showed EF of 50%. Labs were reviewed including labs from other hospital systems through [facility]. Cardiac testing was reviewed including echos, nuclear scans, cardiac catheterization, including from other hospital systems through [facility]. Assessment:   1. Systolic CHF, chronic ([address]    2. Essential hypertension    3. Pure hypercholesterolemia    4. SOB (shortness of breath)    5. Vitamin D insufficiency    6. Cardiac resynchronization therapy defibrillator (CRT-D) in place         1. Systolic CHF, chronic ([address]   2. Essential hypertension   3. Pure hypercholesterolemia   4. SOB (shortness of breath)   5.  Automatic implantable cardioverter-defibrillator in situ     Impedance monitoring via [employer] Optivol Cardiac Compass was downloaded from patient's ICD and reviewed. The impedance shows stable fluid level. Plan:  1. Increase Amlodipine to 5 mg twice daily. 2. Stop Irbesartan and Lisinopril. 3. Restart Valsartan 320 mg daily for better blood pressure control. 4. Echocardiogram toe valuate heart function and structure. Get this after the new year. 5. Labs: TSH/free T4, CBC, Lipid, CMP, BNP. 6. Follow up with me in 1 year. This note was scribed in the presence of Dr. Jo Holley MD by Angy Garvin RN. The scribe's documentation has been prepared under my direction and personally reviewed by me in its entirety. I confirm that the note above accurately reflects all work, treatment, procedures, and medical decision making performed by me.         Ata Little M.D., [facility]

## 2020-12-08 RX ORDER — ATORVASTATIN CALCIUM 20 MG/1
TABLET, FILM COATED ORAL
Qty: 90 TABLET | Refills: 1 | Status: SHIPPED | OUTPATIENT
Start: 2020-12-08

## 2020-12-08 NOTE — TELEPHONE ENCOUNTER
Lov 11/24/2020  Labs 10/23/2020 & 9/3/2020  Lrf 11/4/2020 Disp 30+0  Appt 11/30/2021 please advise thanks.

## 2021-01-04 ENCOUNTER — NURSE ONLY (OUTPATIENT)
Dept: CARDIOLOGY CLINIC | Age: 61
End: 2021-01-04
Payer: MEDICARE

## 2021-01-04 DIAGNOSIS — Z95.810 CARDIAC RESYNCHRONIZATION THERAPY DEFIBRILLATOR (CRT-D) IN PLACE: ICD-10-CM

## 2021-01-04 DIAGNOSIS — I42.8 NON-ISCHEMIC CARDIOMYOPATHY (HCC): ICD-10-CM

## 2021-01-04 DIAGNOSIS — I50.22 SYSTOLIC CHF, CHRONIC (HCC): ICD-10-CM

## 2021-01-04 PROCEDURE — 93295 DEV INTERROG REMOTE 1/2/MLT: CPT | Performed by: INTERNAL MEDICINE

## 2021-01-04 PROCEDURE — 93297 REM INTERROG DEV EVAL ICPMS: CPT | Performed by: INTERNAL MEDICINE

## 2021-01-04 PROCEDURE — 93296 REM INTERROG EVL PM/IDS: CPT | Performed by: INTERNAL MEDICINE

## 2021-01-04 NOTE — PROGRESS NOTES
We received remote transmission from patient's monitor at home. Transmission shows normal sensing and pacing function. EP physician will review. See interrogation under cardiology tab in the [address] [facility] [address] field for more details. Pacing (% of Time Since 24-Nov-2020)  Total * 97.8% (MVP Off)  Effective 97.8%  Longest ventricular sensing episode since the last session is greater than 60 seconds. · Ventricular sensing episodes averaged 14 min/day since the last session. Average v rates  bpm. Thoracic impedance trend stable. (coreg). Follow up in 3 months via carelink.

## 2021-04-08 ENCOUNTER — NURSE ONLY (OUTPATIENT)
Dept: CARDIOLOGY CLINIC | Age: 61
End: 2021-04-08
Payer: MEDICARE

## 2021-04-08 DIAGNOSIS — I50.22 SYSTOLIC CHF, CHRONIC (HCC): ICD-10-CM

## 2021-04-08 DIAGNOSIS — I44.7 LBBB (LEFT BUNDLE BRANCH BLOCK): ICD-10-CM

## 2021-04-08 DIAGNOSIS — Z95.810 CARDIAC RESYNCHRONIZATION THERAPY DEFIBRILLATOR (CRT-D) IN PLACE: ICD-10-CM

## 2021-04-08 DIAGNOSIS — I42.8 NON-ISCHEMIC CARDIOMYOPATHY (HCC): ICD-10-CM

## 2021-04-08 NOTE — PROGRESS NOTES
We received remote transmission from patient's monitor at home. Transmission shows normal sensing and pacing function. EP physician will review. See interrogation under cardiology tab in the [address] [place] [address] [address] field for more details. Pacing (% of Time Since 04-Jan-2021)  Total * 97.4% (MVP Off)  Effective 97.4%   No  arrhythmias recorded. Thoracic impedance trend stable. Follow up in 3 months via [facility] Tracking.

## 2021-04-14 PROCEDURE — 93297 REM INTERROG DEV EVAL ICPMS: CPT | Performed by: INTERNAL MEDICINE

## 2021-04-14 PROCEDURE — 93295 DEV INTERROG REMOTE 1/2/MLT: CPT | Performed by: INTERNAL MEDICINE

## 2021-04-14 PROCEDURE — 93296 REM INTERROG EVL PM/IDS: CPT | Performed by: INTERNAL MEDICINE

## 2021-07-12 ENCOUNTER — NURSE ONLY (OUTPATIENT)
Dept: CARDIOLOGY CLINIC | Age: 61
End: 2021-07-12
Payer: MEDICARE

## 2021-07-12 DIAGNOSIS — I44.7 LBBB (LEFT BUNDLE BRANCH BLOCK): ICD-10-CM

## 2021-07-12 DIAGNOSIS — Z95.810 CARDIAC RESYNCHRONIZATION THERAPY DEFIBRILLATOR (CRT-D) IN PLACE: ICD-10-CM

## 2021-07-12 DIAGNOSIS — I50.22 SYSTOLIC CHF, CHRONIC (HCC): ICD-10-CM

## 2021-07-12 DIAGNOSIS — I42.8 NON-ISCHEMIC CARDIOMYOPATHY (HCC): ICD-10-CM

## 2021-07-12 PROCEDURE — 93295 DEV INTERROG REMOTE 1/2/MLT: CPT | Performed by: INTERNAL MEDICINE

## 2021-07-12 PROCEDURE — 93296 REM INTERROG EVL PM/IDS: CPT | Performed by: INTERNAL MEDICINE

## 2021-07-12 PROCEDURE — 93297 REM INTERROG DEV EVAL ICPMS: CPT | Performed by: INTERNAL MEDICINE

## 2021-07-15 NOTE — PROGRESS NOTES
Remote transmission received for patients CRT-D. Transmission shows normal sensing and pacing function. EP physician will review. See interrogation under the cardiology tab in the [address] [place] [address] [address] field for more details. Will continue to monitor remotely. Pacing (% of Time Since 08-Apr-2021)  Total * 97.7% (MVP Off)  Effective 97.7%  Thoracic impedance trend stable. No  arrhythmias recorded.

## 2021-08-17 ENCOUNTER — HOSPITAL ENCOUNTER (OUTPATIENT)
Dept: CT IMAGING | Age: 61
Discharge: HOME | End: 2021-08-17
Payer: MEDICARE

## 2021-08-17 DIAGNOSIS — Z87.891 HISTORY OF TOBACCO USE: ICD-10-CM

## 2021-08-17 PROCEDURE — 71271 CT THORAX LUNG CANCER SCR C-: CPT

## 2021-08-25 ENCOUNTER — OFFICE VISIT (OUTPATIENT)
Dept: PULMONOLOGY | Age: 61
End: 2021-08-25
Payer: MEDICARE

## 2021-08-25 VITALS
TEMPERATURE: 96.1 F | WEIGHT: 212 LBS | HEART RATE: 87 BPM | BODY MASS INDEX: 36.19 KG/M2 | OXYGEN SATURATION: 98 % | DIASTOLIC BLOOD PRESSURE: 68 MMHG | HEIGHT: 64 IN | RESPIRATION RATE: 18 BRPM | SYSTOLIC BLOOD PRESSURE: 140 MMHG

## 2021-08-25 DIAGNOSIS — Z87.891 PERSONAL HISTORY OF TOBACCO USE: ICD-10-CM

## 2021-08-25 DIAGNOSIS — J45.40 MODERATE PERSISTENT ASTHMA WITHOUT COMPLICATION: Primary | ICD-10-CM

## 2021-08-25 PROCEDURE — 99213 OFFICE O/P EST LOW 20 MIN: CPT | Performed by: INTERNAL MEDICINE

## 2021-08-25 RX ORDER — ALBUTEROL SULFATE 90 UG/1
2 AEROSOL, METERED RESPIRATORY (INHALATION) EVERY 4 HOURS PRN
Qty: 3 INHALER | Refills: 3 | Status: SHIPPED | OUTPATIENT
Start: 2021-08-25 | End: 2022-09-07

## 2021-08-25 NOTE — PROGRESS NOTES
PULMONARY, CRITICAL CARE AND SLEEP MEDICINE     CC/REFERRING PROVIDER:  Patient is being seen at the request of Dr. Foster Lanier for a consultation for shortness of breath      Interval History:     ·  doing well with Breo. Uses every day in summer, uses albuterol prior to going out on hot/humid days. Doesn't need either in wintertime. PRESENTING HPI: 64 yo WF with non ischemic cardiomyopathy, over the winter 2018 debilitating shortness of breath and cough, lost voice. Over the last few weeks, with central air much less coughing and breathing is better. Over the last few days, coughing more at night. No family history of asthma, but did have asthma as a child. Of note, she cannot take a single big breath without coughing, she cannot exercise at all without shortness of breath and cough. reports that she quit smoking about 6 years ago. She has a 32.67 pack-year smoking history. She has never used smokeless tobacco.  Just less than 1 ppd X 35 years       PHYSICAL EXAM:  Blood pressure (!) 140/68, pulse 87, temperature 96.1 °F (35.6 °C), resp. rate 18, height 5' 4\" (1.626 m), weight 212 lb (96.2 kg), SpO2 98 %.'  Constitutional:  No acute distress. HENT:  Oropharynx is clear and moist.   Neck: No tracheal deviation present. Cardiovascular: Normal heart sounds. No lower extremity edema. Pulmonary/Chest: No wheezes. No rhonchi. No rales. No decreased breath sounds. No accessory muscle usage or stridor. Musculoskeletal: No cyanosis. No clubbing. Skin: Skin is warm and dry. Psychiatric: Normal mood and affect.   Neurologic: speech fluent, alert and oriented, strength symmetric      DATA:  LDCT 8/17/21  Impression   Scattered noncalcified pulmonary nodules are seen, similar to prior.       Mild-to-moderate coronary artery calcification     PFT 10/31/18 FEV1 2.42 L 92% TLC 4.98 L 95%  DLCO 18.64 79%    ASSESSMENT:  · Moderate persistent asthma -very well controlled    Not addressed today  · Pulmonary Nodules   · Non ischemic cardiomyopathy, f/b Dr. Kacy Vargas, improved on f/u ECHO  · Biventricular pacemaker  · Family h/o lung cancer   · 31 pack year tobacco, quit in 2017    PLAN:  · LDCT for f/u lung cancer screening in September 2022 & see me aftetr   · Breo 200/25, albuterol PRN for asthma         Screening CT scan was considered in a lung cancer screening counseling and shared decision making visit today that included the following elements:   Eligibility: Age: 62. There are no signs or symptoms of lung cancer. Tobacco History 31 pack-years, quit 4 years ago  Verbal counseling has been performed by me to include benefits and harms of screening, follow-up diagnostic testing, over-diagnosis, false positive rate, and total radiation exposure;   I have counseled on the importance of adherence to annual lung cancer LDCT screening, the impact of comorbidities and patient is willing to undergo diagnosis and treatment;   I have provided counseling on the importance of maintaining cigarette smoking abstinence if former smoker; or the importance of smoking cessation if current smoker and, if appropriate, furnishing of information about tobacco cessation interventions; and   I have furnished a written order for lung cancer screening with LDCT. Order for Screening chest CT scan should be placed with documentation as below:  Beneficiary date of birth; Actual pack - year smoking history (number) from above;   Current smoking status, and for former smokers, the number of years since quitting smoking from above  Beneficiary is asymptomatic   National Provider Identifier (NPI) for Joan Ghotra [id number]    Nelli Lorenzo is a 62 y.o. female being evaluated by a Virtual Visit (video visit) encounter to address concerns as mentioned above. A caregiver was present when appropriate.  Due to this being a TeleHealth encounter (During OJPOO-70 public health emergency), evaluation of the following organ systems was limited: Vitals/Constitutional/EENT/Resp/CV/GI//MS/Neuro/Skin/Heme-Lymph-Imm. Pursuant to the emergency declaration under the [address] [place] [address] and the [employer] Act, this Virtual Visit was conducted with patient's (and/or legal guardian's) consent, to reduce the patient's risk of exposure to COVID-19 and provide necessary medical care. The patient (and/or legal guardian) has also been advised to contact this office for worsening conditions or problems, and seek emergency medical treatment and/or call 911 if deemed necessary. Services were provided through a video synchronous discussion virtually to substitute for in-person clinic visit. Patient was located in her home, provider was located in his office. --Anna García MD on 8/25/2021 at 11:25 AM    An electronic signature was used to authenticate this note.

## 2021-10-11 ENCOUNTER — NURSE ONLY (OUTPATIENT)
Dept: CARDIOLOGY CLINIC | Age: 61
End: 2021-10-11
Payer: MEDICARE

## 2021-10-11 DIAGNOSIS — Z95.810 CARDIAC RESYNCHRONIZATION THERAPY DEFIBRILLATOR (CRT-D) IN PLACE: ICD-10-CM

## 2021-10-11 DIAGNOSIS — I42.8 NON-ISCHEMIC CARDIOMYOPATHY (HCC): ICD-10-CM

## 2021-10-11 DIAGNOSIS — I50.22 SYSTOLIC CHF, CHRONIC (HCC): ICD-10-CM

## 2021-10-11 PROCEDURE — 93297 REM INTERROG DEV EVAL ICPMS: CPT | Performed by: INTERNAL MEDICINE

## 2021-10-11 PROCEDURE — 93296 REM INTERROG EVL PM/IDS: CPT | Performed by: INTERNAL MEDICINE

## 2021-10-11 PROCEDURE — 93295 DEV INTERROG REMOTE 1/2/MLT: CPT | Performed by: INTERNAL MEDICINE

## 2021-10-12 NOTE — PROGRESS NOTES
Remote transmission received for patients CRT-D. Transmission shows normal sensing and pacing function. EP physician will review. See interrogation under the cardiology tab in the [address] [place] [address] [address] field for more details. Will continue to monitor remotely. Pacing (% of Time Since 12-Jul-2021)  Total * 97.3% (MVP Off)  Effective 97.3%   No  arrhythmias recorded. Thoracic impedance trend stable.

## 2022-01-10 ENCOUNTER — NURSE ONLY (OUTPATIENT)
Dept: CARDIOLOGY CLINIC | Age: 62
End: 2022-01-10
Payer: MEDICARE

## 2022-01-10 DIAGNOSIS — I42.8 NON-ISCHEMIC CARDIOMYOPATHY (HCC): ICD-10-CM

## 2022-01-10 DIAGNOSIS — I50.22 SYSTOLIC CHF, CHRONIC (HCC): ICD-10-CM

## 2022-01-10 DIAGNOSIS — Z95.810 CARDIAC RESYNCHRONIZATION THERAPY DEFIBRILLATOR (CRT-D) IN PLACE: ICD-10-CM

## 2022-01-11 PROCEDURE — 93297 REM INTERROG DEV EVAL ICPMS: CPT | Performed by: INTERNAL MEDICINE

## 2022-01-11 NOTE — PROGRESS NOTES
My chart message sent to pt to send manual carelink. Date of Interrogation: 11-Jan-2022 22:36:23. Remote transmission received for patients CRT-D. Transmission shows normal sensing and pacing function. EP physician will review. See interrogation under the cardiology tab in the [address] [place] [address] [address] field for more details. Will continue to monitor remotely. Presenting EGM shows AS/BP @ 520 ms. Night heart rate over 85 bpm for 7 days. Cardiac compass shows stable/consistent trends. She takes Coreg. Average V rates  bpm.  Pacing (% of Time Since 11-Oct-2021)  Total * 96.6% (MVP Off)  Effective 96.6%. Thoracic impedance trending up. No  arrhythmias recorded. Jerrod Brittle

## 2022-01-13 PROCEDURE — 93296 REM INTERROG EVL PM/IDS: CPT | Performed by: INTERNAL MEDICINE

## 2022-01-13 PROCEDURE — 93295 DEV INTERROG REMOTE 1/2/MLT: CPT | Performed by: INTERNAL MEDICINE

## 2022-01-17 ENCOUNTER — NURSE ONLY (OUTPATIENT)
Dept: CARDIOLOGY CLINIC | Age: 62
End: 2022-01-17

## 2022-01-17 DIAGNOSIS — Z95.810 CARDIAC RESYNCHRONIZATION THERAPY DEFIBRILLATOR (CRT-D) IN PLACE: ICD-10-CM

## 2022-01-17 NOTE — PROGRESS NOTES
OV MATEO 1/18  optivol @ 0 and TI trending below reference since 1/15/22. Remote transmission received for patients CRT-D. Transmission shows normal sensing and pacing function. EP physician will review. See interrogation under the cardiology tab in the [address] [place] [address] [address] field for more details. Will continue to monitor remotely.

## 2022-01-18 ENCOUNTER — OFFICE VISIT (OUTPATIENT)
Dept: CARDIOLOGY CLINIC | Age: 62
End: 2022-01-18
Payer: MEDICARE

## 2022-01-18 VITALS
BODY MASS INDEX: 35.94 KG/M2 | WEIGHT: 210.5 LBS | OXYGEN SATURATION: 98 % | HEART RATE: 95 BPM | DIASTOLIC BLOOD PRESSURE: 72 MMHG | HEIGHT: 64 IN | SYSTOLIC BLOOD PRESSURE: 168 MMHG

## 2022-01-18 DIAGNOSIS — I10 PRIMARY HYPERTENSION: ICD-10-CM

## 2022-01-18 DIAGNOSIS — I44.7 LBBB (LEFT BUNDLE BRANCH BLOCK): ICD-10-CM

## 2022-01-18 DIAGNOSIS — E78.5 HYPERLIPIDEMIA, UNSPECIFIED HYPERLIPIDEMIA TYPE: ICD-10-CM

## 2022-01-18 DIAGNOSIS — E55.9 VITAMIN D DEFICIENCY: ICD-10-CM

## 2022-01-18 DIAGNOSIS — I42.8 NON-ISCHEMIC CARDIOMYOPATHY (HCC): ICD-10-CM

## 2022-01-18 DIAGNOSIS — I50.22 SYSTOLIC CHF, CHRONIC (HCC): Primary | ICD-10-CM

## 2022-01-18 DIAGNOSIS — E78.00 PURE HYPERCHOLESTEROLEMIA: ICD-10-CM

## 2022-01-18 DIAGNOSIS — Z95.810 CARDIAC RESYNCHRONIZATION THERAPY DEFIBRILLATOR (CRT-D) IN PLACE: ICD-10-CM

## 2022-01-18 PROCEDURE — 99214 OFFICE O/P EST MOD 30 MIN: CPT | Performed by: INTERNAL MEDICINE

## 2022-01-18 RX ORDER — IBUPROFEN 800 MG/1
TABLET ORAL
COMMUNITY
Start: 2021-11-08

## 2022-01-18 NOTE — PATIENT INSTRUCTIONS
Your provider has ordered testing for further evaluation. An order/prescription has been included in your paper work.  To schedule outpatient testing, contact Central Scheduling by calling [address] [place] [address] ([phone]). Plan:  1. Start terazosin 5 mg daily for high blood pressure   - let me know if this causes urinary incontinece  2. Labs: CBC, BNP, Lipids fasting, Vitamin D, TSH  3. Echo to assess heart function and strength  4.  Follow up for a blood pressure check in 2 months with NP

## 2022-01-18 NOTE — PROGRESS NOTES
[address]   Advanced Heart Failure/Pulmonary Hypertension  Cardiac Follow up       Gissell Pepe  YOB: 1960    Date of Visit:  1/18/22    Chief Complaint   Patient presents with    Follow-up    Congestive Heart Failure           History of Present Illness:  Ms Samuel Myers originally presented for follow up of her nonischemic cardiomyopathy with LBBB. She had a RHC 1/7/09 which showed normal coronaries and normal cardiac output. She was told years ago that her EF was 20-25%. 8/08 she had an angiogram that showed normal coronaries and EF 30%. However, MUGA scan revealed EF 61%. June, 2009 she had placement of bi-v defibrillator, and has had no shocks. Echo 10/09 showed EF 40-45% and decrease in left ventricular size. She had an echo today that showed EF 50%. As part of her history was told she has a slight underactive thyroid. Her echo from 3/23/17 shows an EF of 50-55%. Her device check from 3/23/17 shows normal device sensing and function with normal fluid level on her Optivol. She lost her mother to lung cancer in September 2017. Her echo from 04/18/18 showed her EF was 55%. Mild MR. Mild to moderate AR. She underwent a generator change on 80 with Dr. Giselle Palma. Today she states she is doing well. She states she checks her blood pressure at home. Today at home her BP was 197/101 after taking morning medications. Patient is taking all cardiac medications as prescribed and tolerates them well. Patient denies current edema, chest pain, sob, palpitations, dizziness or syncope.          Allergies   Allergen Reactions    Tetracyclines & Related Nausea And Vomiting    Erythromycin Base      nausea    Lisinopril      Flu like symptoms    Nsaids Nausea Only    Trazodone Other (See Comments)     Hyperactivity   jitteriness     Current Outpatient Medications   Medication Sig Dispense Refill    Fluticasone furoate-vilanterol (BREO ELLIPTA) 200-25 MCG/INH AEPB inhaler Inhale 1 puff into the lungs daily 3 each 3    albuterol sulfate HFA (PROVENTIL HFA) 108 (90 Base) MCG/ACT inhaler Inhale 2 puffs into the lungs every 4 hours as needed for Wheezing or Shortness of Breath 3 Inhaler 3    atorvastatin (LIPITOR) 20 MG tablet TAKE ONE TABLET BY MOUTH DAILY 90 tablet 1    carvedilol (COREG) 25 MG tablet Take 1 tablet by mouth 2 times daily (with meals) 180 tablet 3    valsartan (DIOVAN) 320 MG tablet Take 1 tablet by mouth daily 90 tablet 3    amLODIPine (NORVASC) 5 MG tablet Take 1 tablet by mouth 2 times daily 180 tablet 3    omeprazole (PRILOSEC) 20 MG delayed release capsule Take 20 mg by mouth daily      meclizine (ANTIVERT) 12.5 MG tablet Take 12.5 mg by mouth 2 times daily (Patient not taking: Reported on 8/25/2021)      topiramate (TOPAMAX) 100 MG tablet Take 100 mg by mouth daily      levothyroxine (SYNTHROID) 25 MCG tablet Take 25 mcg by mouth Daily. No current facility-administered medications for this visit.        Immunization History   Administered Date(s) Administered    COVID-19, Pfizer, PF, 30mcg/0.3mL 05/14/2021, 06/05/2021    Influenza Vaccine, unspecified formulation 09/14/2018    Influenza, Quadv, IM, (6 mo and older Fluzone, Flulaval, Fluarix and 3 yrs and older Afluria) 09/12/2008, 10/31/2018    Influenza, Quadv, IM, PF (6 mo and older Fluzone, Flulaval, Fluarix, and 3 yrs and older Afluria) 09/14/2018    Pneumococcal Polysaccharide ([id number]) 09/08/2011, 09/14/2018    Tdap (Boostrix, Adacel) 08/01/2016       Patient Active Problem List   Diagnosis    Hypertension    Hyperlipidemia    Palpitations    [facility]st[facility], [facility]    Medtronic CRT-D w/ optivol-Gen change 2/12/14 and 9/3/20 (leads 6/17/09)    SOB (shortness of breath)    Non-ischemic cardiomyopathy (HCC)    LBBB (left bundle branch block)    ICD (implantable cardioverter-defibrillator) battery depletion       Past Medical History:   Diagnosis Date    BIPOLAR     Cardiomyopathy ([facility] UNKNOWN CAUSE    CHF (congestive heart failure) (HCC)     Fibromyalgia     GERD (gastroesophageal reflux disease)     Hyperlipidemia     Hypertension     Palpitations     Plantar fasciitis left lower extremity    TORN MENISCUS BOTH KNEES     Vitamin D deficiency      Past Surgical History:   Procedure Laterality Date    BREAST ENHANCEMENT SURGERY      CARDIAC DEFIBRILLATOR PLACEMENT Left 2009    Medtronic BiV-ICD     SECTION      x3    COLONOSCOPY      ENDOSCOPY, COLON, DIAGNOSTIC      KNEE SURGERY  10/2011    Left Maniscus    NECK SURGERY      FUSION C5/6 WITH CADAVAR BONES 2002    PACEMAKER PLACEMENT  2020    Generator change- ICD    PACEMAKER PLACEMENT  2014    Bi-V ICD Generator change    TONSILLECTOMY       Family History   Problem Relation Age of Onset    Kidney Disease Mother     Stroke Father     High Blood Pressure Father     Heart Disease Father     High Blood Pressure Brother     Diabetes Brother      Social History     Socioeconomic History    Marital status:      Spouse name: Not on file    Number of children: Not on file    Years of education: Not on file    Highest education level: Not on file   Occupational History    Not on file   Tobacco Use    Smoking status: Former Smoker     Packs/day: 0.99     Years: 33.00     Pack years: 32.67     Quit date: 2014     Years since quittin.1    Smokeless tobacco: Never Used   Substance and Sexual Activity    Alcohol use: No    Drug use: No    Sexual activity: Not Currently   Other Topics Concern    Not on file   Social History Narrative    Not on file     Social Determinants of Health     Financial Resource Strain:     Difficulty of Paying Living Expenses: Not on file   Food Insecurity:     Worried About Running Out of Food in the Last Year: Not on file    Wally of Food in the Last Year: Not on file   Transportation Needs:     Lack of Transportation (Medical):  Not on file  Lack of Transportation (Non-Medical): Not on file   Physical Activity:     Days of Exercise per Week: Not on file    Minutes of Exercise per Session: Not on file   Stress:     Feeling of Stress : Not on file   Social Connections:     Frequency of Communication with Friends and Family: Not on file    Frequency of Social Gatherings with Friends and Family: Not on file    Attends Buddhist Services: Not on file    Active Member of [address] [place] [address] or Organizations: Not on file    Attends Club or Organization Meetings: Not on file    Marital Status: Not on file   Intimate Partner Violence:     Fear of Current or Ex-Partner: Not on file    Emotionally Abused: Not on file    Physically Abused: Not on file    Sexually Abused: Not on file   Housing Stability:     Unable to Pay for Housing in the Last Year: Not on file    Number of [place] in the Last Year: Not on file    Unstable Housing in the Last Year: Not on file       Review of Systems:   · Constitutional: there has been no unanticipated weight loss. There's been no change in energy level, sleep pattern, or activity level. · Eyes: No visual changes or diplopia. No scleral icterus. · ENT: No Headaches, hearing loss or vertigo. No mouth sores or sore throat. · Cardiovascular: No chest pain or palpitations but worsening edema. · Respiratory: SOL worsening   · Gastrointestinal: No abdominal pain, appetite loss, blood in stools. No change in bowel or bladder habits. · Genitourinary: No dysuria, trouble voiding, or hematuria. · Musculoskeletal:  No gait disturbance, weakness or joint complaints. · Integumentary: No rash or pruritis. · Neurological: No headache, diplopia, change in muscle strength, numbness or tingling. No change in gait, balance, coordination, mood, affect, memory, mentation, behavior. · Psychiatric: No anxiety, no depression. · Endocrine: No malaise, fatigue or temperature intolerance.  No excessive thirst, fluid intake, or urination. No tremor. · Hematologic/Lymphatic: No abnormal bruising or bleeding, blood clots or swollen lymph nodes. · Allergic/Immunologic: No nasal congestion or hives. Physical Examination:    Vitals:    01/18/22 1303 01/18/22 1309   BP: (!) 166/72 (!) 168/72   Pulse: 95    SpO2: 98%    Weight: 210 lb 8 oz (95.5 kg)    Height: 5' 4\" (1.626 m)      Body mass index is 36.13 kg/m². Wt Readings from Last 3 Encounters:   01/18/22 210 lb 8 oz (95.5 kg)   08/25/21 212 lb (96.2 kg)   11/24/20 201 lb 3.2 oz (91.3 kg)     BP Readings from Last 3 Encounters:   01/18/22 (!) 168/72   08/25/21 (!) 140/68   11/24/20 (!) 184/90          Constitutional and General Appearance:   WD/WN in NAD, pale appearing  HEENT:  NC/AT  Respiratory:  · Normal excursion and expansion without use of accessory muscles  · Resp Auscultation: Normal breath sounds without dullness  Cardiovascular:  · The apical impulses not displaced  · Heart tones are crisp and normal  · Cervical veins are not engorged  · The carotid upstroke is normal in amplitude and contour without delay or bruit  · JVP 9-10 cm H2O  RRR with nl S1 and S2 without m,r,g  · Peripheral pulses are symmetrical and full  · There is no clubbing, cyanosis of the extremities. · Pitting 2+ edema bilaterally from knees down.     · Femoral Arteries: 2+ and equal  · Pedal Pulses: 2+ and equal   Abdomen:  · No masses or tenderness  · Liver/Spleen: No Abnormalities Noted  Neurological/Psychiatric:  · Alert and oriented in all spheres  · Moves all extremities well  · Exhibits normal gait balance and coordination  · No abnormalities of mood, affect, memory, mentation, or behavior are noted  ·   Lab Results   Component Value Date     09/03/2020     10/23/2019     09/03/2018    K 4.1 09/03/2020    K 4.6 10/23/2019    K 3.8 09/03/2018    BUN 15 09/03/2020    BUN 17 10/23/2019    BUN 8 09/03/2018    CREATININE 0.9 09/03/2020    CREATININE 1.0 10/23/2019    CREATININE 0.8 ([facility]    4. LBBB (left bundle branch block)    5. Primary hypertension    6. Pure hypercholesterolemia    7. Hyperlipidemia, unspecified hyperlipidemia type    8. Vitamin D deficiency              Plan:  1. Start terazosin 5 mg daily for high blood pressure   - let me know if this causes urinary incontinece  2. Labs: CBC, BNP, Lipids fasting, Vitamin D, TSH  3. Echo to assess heart function and strength  4. Follow up for a blood pressure check in 2 months with NP        This note was scribed in the presence of Luis Felipe Chua MD by Ashley Norris RN    The scribe's documentation has been prepared under my direction and personally reviewed by me in its entirety. I confirm that the note above accurately reflects all work, treatment, procedures, and medical decision making performed by me. Time Based Itemization  A total of 30 minutes was spent on today's patient encounter.   If applicable, non-patient-facing activities:  ( x)Preparing to see the patient and reviewing records  ( ) Individual interpretation of results  ( ) Discussion or coordination of care with other health care professionals  ( x) Ordering of unique tests, medications, or procedures  ( x) Documentation within the EHR

## 2022-01-20 ENCOUNTER — TELEPHONE (OUTPATIENT)
Dept: CARDIOLOGY CLINIC | Age: 62
End: 2022-01-20

## 2022-01-20 ENCOUNTER — HOSPITAL ENCOUNTER (OUTPATIENT)
Age: 62
Discharge: HOME | End: 2022-01-20
Payer: MEDICARE

## 2022-01-20 DIAGNOSIS — E55.9 VITAMIN D DEFICIENCY: ICD-10-CM

## 2022-01-20 DIAGNOSIS — E78.5 HYPERLIPIDEMIA, UNSPECIFIED HYPERLIPIDEMIA TYPE: ICD-10-CM

## 2022-01-20 DIAGNOSIS — I50.22 SYSTOLIC CHF, CHRONIC (HCC): ICD-10-CM

## 2022-01-20 LAB
BASOPHILS ABSOLUTE: 0.1 K/UL (ref 0–0.2)
BASOPHILS RELATIVE PERCENT: 0.6 %
CHOLESTEROL, TOTAL: 179 MG/DL (ref 0–199)
EOSINOPHILS ABSOLUTE: 0.1 K/UL (ref 0–0.6)
EOSINOPHILS RELATIVE PERCENT: 1.5 %
HCT VFR BLD CALC: 43.4 % (ref 36–48)
HDLC SERPL-MCNC: 57 MG/DL (ref 40–60)
HEMOGLOBIN: 14.6 G/DL (ref 12–16)
LDL CHOLESTEROL CALCULATED: 94 MG/DL
LYMPHOCYTES ABSOLUTE: 3.3 K/UL (ref 1–5.1)
LYMPHOCYTES RELATIVE PERCENT: 34.9 %
MCH RBC QN AUTO: 31.3 PG (ref 26–34)
MCHC RBC AUTO-ENTMCNC: 33.7 G/DL (ref 31–36)
MCV RBC AUTO: 92.9 FL (ref 80–100)
MONOCYTES ABSOLUTE: 0.7 K/UL (ref 0–1.3)
MONOCYTES RELATIVE PERCENT: 7.2 %
NEUTROPHILS ABSOLUTE: 5.3 K/UL (ref 1.7–7.7)
NEUTROPHILS RELATIVE PERCENT: 55.8 %
PDW BLD-RTO: 13.5 % (ref 12.4–15.4)
PLATELET # BLD: 247 K/UL (ref 135–450)
PMV BLD AUTO: 8.4 FL (ref 5–10.5)
PRO-BNP: 478 PG/ML (ref 0–124)
RBC # BLD: 4.67 M/UL (ref 4–5.2)
TRIGL SERPL-MCNC: 140 MG/DL (ref 0–150)
TSH REFLEX FT4: 7.07 UIU/ML (ref 0.27–4.2)
VLDLC SERPL CALC-MCNC: 28 MG/DL
WBC # BLD: 9.6 K/UL (ref 4–11)

## 2022-01-20 PROCEDURE — 82306 VITAMIN D 25 HYDROXY: CPT

## 2022-01-20 PROCEDURE — 80061 LIPID PANEL: CPT

## 2022-01-20 PROCEDURE — 83880 ASSAY OF NATRIURETIC PEPTIDE: CPT

## 2022-01-20 PROCEDURE — 84443 ASSAY THYROID STIM HORMONE: CPT

## 2022-01-20 PROCEDURE — 85025 COMPLETE CBC W/AUTO DIFF WBC: CPT

## 2022-01-20 PROCEDURE — 84439 ASSAY OF FREE THYROXINE: CPT

## 2022-01-20 PROCEDURE — 36415 COLL VENOUS BLD VENIPUNCTURE: CPT

## 2022-01-20 RX ORDER — TERAZOSIN 5 MG/1
5 CAPSULE ORAL NIGHTLY
Qty: 30 CAPSULE | Refills: 3 | Status: SHIPPED | OUTPATIENT
Start: 2022-01-20 | End: 2022-05-17

## 2022-01-20 NOTE — TELEPHONE ENCOUNTER
Patient called her pharmacy and they do not have the new script ready. The medication is in the notes but I do not see a script. [facility]     TY

## 2022-01-21 LAB
T4 FREE: 1.2 NG/DL (ref 0.9–1.8)
VITAMIN D 25-HYDROXY: 22 NG/ML

## 2022-01-24 NOTE — RESULT ENCOUNTER NOTE
Covering for Dr. Amanda Pereyra. Vitamin D level still low but not as low as before. Recommend daily vit D3 OTC supplement 2000 IU daily. TSH reflex was normal. BNP was up but still normal for your  age.

## 2022-01-25 ENCOUNTER — TELEPHONE (OUTPATIENT)
Dept: CARDIOLOGY CLINIC | Age: 62
End: 2022-01-25

## 2022-01-25 NOTE — LETTER
[address] [place] [address] Cardiology - [address] [place] [address] [facility]  Phone: [phone]  Fax: [phone], APRN - CNP        January 28, 2022    Angie Poster  [facility] [place] [address]      Dear Nneka Mendes: [facility] has attempted to contact you several times. However we have been unsuccessful. Please contact the office at you earliest convenience. We would like to relay a message from your provider.          Sincerely,        SARAI Pickard CNP

## 2022-01-28 NOTE — TELEPHONE ENCOUNTER
CANDICEOM for pt to contact the office. Multiple attempts made to contact pt. I will mail her a letter.

## 2022-02-01 RX ORDER — TERAZOSIN 5 MG/1
5 CAPSULE ORAL DAILY
Qty: 90 CAPSULE | Refills: 3 | Status: SHIPPED | OUTPATIENT
Start: 2022-02-01 | End: 2022-05-17

## 2022-02-02 ENCOUNTER — HOSPITAL ENCOUNTER (OUTPATIENT)
Dept: NON INVASIVE DIAGNOSTICS | Age: 62
Discharge: HOME | End: 2022-02-02
Payer: MEDICARE

## 2022-02-02 DIAGNOSIS — I50.22 SYSTOLIC CHF, CHRONIC (HCC): ICD-10-CM

## 2022-02-02 LAB
LV EF: 55 %
LVEF MODALITY: NORMAL

## 2022-02-02 PROCEDURE — 93306 TTE W/DOPPLER COMPLETE: CPT

## 2022-04-11 ENCOUNTER — NURSE ONLY (OUTPATIENT)
Dept: CARDIOLOGY CLINIC | Age: 62
End: 2022-04-11
Payer: MEDICARE

## 2022-04-11 DIAGNOSIS — I50.22 SYSTOLIC CHF, CHRONIC (HCC): ICD-10-CM

## 2022-04-11 DIAGNOSIS — I42.8 NON-ISCHEMIC CARDIOMYOPATHY (HCC): ICD-10-CM

## 2022-04-11 DIAGNOSIS — Z95.810 CARDIAC RESYNCHRONIZATION THERAPY DEFIBRILLATOR (CRT-D) IN PLACE: ICD-10-CM

## 2022-04-11 NOTE — PROGRESS NOTES
optivol nprb. Thoracic impedance trend stable. Consistent up/down trend noted and TI trending up to reference as of 4/8/22. Remote transmission received for patients CRT-D. Transmission shows normal sensing and pacing function. EP physician will review. See interrogation under the cardiology tab in the [address] [place] [address] [address] field for more details. Will continue to monitor remotely. Pacing (% of Time Since 17-Jan-2022)  Total * 98.1% (MVP Off)  Effective 98.1%   No  arrhythmias recorded.

## 2022-04-15 PROCEDURE — 93295 DEV INTERROG REMOTE 1/2/MLT: CPT | Performed by: INTERNAL MEDICINE

## 2022-04-15 PROCEDURE — 93297 REM INTERROG DEV EVAL ICPMS: CPT | Performed by: INTERNAL MEDICINE

## 2022-04-15 PROCEDURE — 93296 REM INTERROG EVL PM/IDS: CPT | Performed by: INTERNAL MEDICINE

## 2022-05-10 ENCOUNTER — TELEPHONE (OUTPATIENT)
Dept: CARDIOLOGY CLINIC | Age: 62
End: 2022-05-10

## 2022-05-10 NOTE — TELEPHONE ENCOUNTER
Lennox Lopez from [facility] stated that they will need pts latest device integration before surgery. Report can be faxed to [phone]. Pt is having a total knee replacement with Dr. Niurka Treviño on 06/01/2022. Pt is seeing mira on 05/17/2022 for cardiac clearance.

## 2022-05-10 NOTE — TELEPHONE ENCOUNTER
Printed recent [facility] interrogation report and faxed to [phone], addressed to [facility]; cc: Augustus Nelson

## 2022-05-10 NOTE — PROGRESS NOTES
[facility]   Advanced Heart Failure/Pulmonary Hypertension  Cardiac Follow up       Gayle Seip  YOB: 1960    Date of Visit:  5/10/22    ***      History of Present Illness:  Ms Elizabeth Parr originally presented for follow up of her nonischemic cardiomyopathy with LBBB. She had a RHC 1/7/09 which showed normal coronaries and normal cardiac output. She was told years ago that her EF was 20-25%. 8/08 she had an angiogram that showed normal coronaries and EF 30%. However, MUGA scan revealed EF 61%. June, 2009 she had placement of bi-v defibrillator, and has had no shocks. Echo 10/09 showed EF 40-45% and decrease in left ventricular size. She had an echo today that showed EF 50%. As part of her history was told she has a slight underactive thyroid. Her echo from 3/23/17 shows an EF of 50-55%. Her device check from 3/23/17 shows normal device sensing and function with normal fluid level on her Optivol. She lost her mother to lung cancer in September 2017. Her echo from 04/18/18 showed her EF was 55%. Mild MR. Mild to moderate AR. She underwent a generator change on 80 with Dr. Anuj Cao. At [address] 1/18/2022, her BP was 197/101 after taking morning medications. At conclusion of visit she was started on terazosin 5mg daily. Her echo 2/2/2022 demonstrated an LVEF of 55%.    Today, 5/17/2022, ***      Allergies   Allergen Reactions    Tetracyclines & Related Nausea And Vomiting    Erythromycin Base      nausea    Lisinopril      Flu like symptoms    Nsaids Nausea Only    Trazodone Other (See Comments)     Hyperactivity   jitteriness     Current Outpatient Medications   Medication Sig Dispense Refill    terazosin (HYTRIN) 5 MG capsule Take 1 capsule by mouth daily 90 capsule 3    terazosin (HYTRIN) 5 MG capsule Take 1 capsule by mouth nightly 30 capsule 3    ibuprofen (ADVIL;MOTRIN) 800 MG tablet TAKE 1 TABLET BY MOUTH EVERY EIGHT HOURS AS NEEDED      Fluticasone furoate-vilanterol (BREO ELLIPTA) 200-25 MCG/INH AEPB inhaler Inhale 1 puff into the lungs daily 3 each 3    albuterol sulfate HFA (PROVENTIL HFA) 108 (90 Base) MCG/ACT inhaler Inhale 2 puffs into the lungs every 4 hours as needed for Wheezing or Shortness of Breath 3 Inhaler 3    atorvastatin (LIPITOR) 20 MG tablet TAKE ONE TABLET BY MOUTH DAILY 90 tablet 1    carvedilol (COREG) 25 MG tablet Take 1 tablet by mouth 2 times daily (with meals) 180 tablet 3    valsartan (DIOVAN) 320 MG tablet Take 1 tablet by mouth daily 90 tablet 3    amLODIPine (NORVASC) 5 MG tablet Take 1 tablet by mouth 2 times daily 180 tablet 3    omeprazole (PRILOSEC) 20 MG delayed release capsule Take 20 mg by mouth daily      topiramate (TOPAMAX) 100 MG tablet Take 100 mg by mouth daily      levothyroxine (SYNTHROID) 25 MCG tablet Take 25 mcg by mouth Daily. No current facility-administered medications for this visit.        Immunization History   Administered Date(s) Administered    COVID-19, Pfizer Purple top, DILUTE for use, 12+ yrs, 30mcg/0.3mL dose 05/14/2021, 06/05/2021    Influenza Vaccine, unspecified formulation 09/14/2018    Influenza, Quadv, IM, (6 mo and older Fluzone, Flulaval, Fluarix and 3 yrs and older Afluria) 09/12/2008, 10/31/2018    Influenza, Quadv, IM, PF (6 mo and older Fluzone, Flulaval, Fluarix, and 3 yrs and older Afluria) 09/14/2018    Pneumococcal Polysaccharide ([id number]) 09/08/2011, 09/14/2018    Tdap (Boostrix, Adacel) 08/01/2016       Patient Active Problem List   Diagnosis    Hypertension    Hyperlipidemia    Palpitations    Systolic CHF, chronic ([facility]    Medtronic CRT-D w/ optivol-Gen change 2/12/14 and 9/3/20 (leads 6/17/09)    SOB (shortness of breath)    Non-ischemic cardiomyopathy (HCC)    LBBB (left bundle branch block)    ICD (implantable cardioverter-defibrillator) battery depletion       Past Medical History:   Diagnosis Date    BIPOLAR     [facility] UNKNOWN CAUSE    CHF (congestive heart failure) (HCC)     Fibromyalgia     GERD (gastroesophageal reflux disease)     Hyperlipidemia     Hypertension     Palpitations     Plantar fasciitis left lower extremity    TORN MENISCUS BOTH KNEES     Vitamin D deficiency      Past Surgical History:   Procedure Laterality Date    BREAST ENHANCEMENT SURGERY      CARDIAC DEFIBRILLATOR PLACEMENT Left 2009    Medtronic BiV-ICD     SECTION      x3    COLONOSCOPY      ENDOSCOPY, COLON, DIAGNOSTIC      KNEE SURGERY  10/2011    Left Maniscus    NECK SURGERY      FUSION C5/6 WITH CADAVAR BONES 2002    PACEMAKER PLACEMENT  2020    Generator change- ICD    PACEMAKER PLACEMENT  2014    Bi-V ICD Generator change    TONSILLECTOMY       Family History   Problem Relation Age of Onset    Kidney Disease Mother     Stroke Father     High Blood Pressure Father     Heart Disease Father     High Blood Pressure Brother     Diabetes Brother      Social History     Socioeconomic History    Marital status:      Spouse name: Not on file    Number of children: Not on file    Years of education: Not on file    Highest education level: Not on file   Occupational History    Not on file   Tobacco Use    Smoking status: Former Smoker     Packs/day: 0.99     Years: 33.00     Pack years: 32.67     Quit date: 2014     Years since quittin.4    Smokeless tobacco: Never Used   Substance and Sexual Activity    Alcohol use: No    Drug use: No    Sexual activity: Not Currently   Other Topics Concern    Not on file   Social History Narrative    Not on file     Social Determinants of Health     Financial Resource Strain:     Difficulty of Paying Living Expenses: Not on file   Food Insecurity:     Worried About Running Out of Food in the Last Year: Not on file    Wally of Food in the Last Year: Not on file   Transportation Needs:     Lack of Transportation (Medical):  Not on file    Lack of Transportation (Non-Medical): Not on file   Physical Activity:     Days of Exercise per Week: Not on file    Minutes of Exercise per Session: Not on file   Stress:     Feeling of Stress : Not on file   Social Connections:     Frequency of Communication with Friends and Family: Not on file    Frequency of Social Gatherings with Friends and Family: Not on file    Attends Adventism Services: Not on file    Active Member of [address] [place] [address] or Organizations: Not on file    Attends Club or Organization Meetings: Not on file    Marital Status: Not on file   Intimate Partner Violence:     Fear of Current or Ex-Partner: Not on file    Emotionally Abused: Not on file    Physically Abused: Not on file    Sexually Abused: Not on file   Housing Stability:     Unable to Pay for Housing in the Last Year: Not on file    Number of [place] in the Last Year: Not on file    Unstable Housing in the Last Year: Not on file       Review of Systems:   · Constitutional: there has been no unanticipated weight loss. There's been no change in energy level, sleep pattern, or activity level. · Eyes: No visual changes or diplopia. No scleral icterus. · ENT: No Headaches, hearing loss or vertigo. No mouth sores or sore throat. · Cardiovascular: No chest pain or palpitations but worsening edema. · Respiratory: SOL worsening   · Gastrointestinal: No abdominal pain, appetite loss, blood in stools. No change in bowel or bladder habits. · Genitourinary: No dysuria, trouble voiding, or hematuria. · Musculoskeletal:  No gait disturbance, weakness or joint complaints. · Integumentary: No rash or pruritis. · Neurological: No headache, diplopia, change in muscle strength, numbness or tingling. No change in gait, balance, coordination, mood, affect, memory, mentation, behavior. · Psychiatric: No anxiety, no depression. · Endocrine: No malaise, fatigue or temperature intolerance.  No excessive thirst, fluid intake, or urination. No tremor. · Hematologic/Lymphatic: No abnormal bruising or bleeding, blood clots or swollen lymph nodes. · Allergic/Immunologic: No nasal congestion or hives. Physical Examination:    ***    Constitutional and General Appearance:   WD/WN in NAD, pale appearing  HEENT:  NC/AT  Respiratory:  · Normal excursion and expansion without use of accessory muscles  · Resp Auscultation: Normal breath sounds without dullness  Cardiovascular:  · The apical impulses not displaced  · Heart tones are crisp and normal  · Cervical veins are not engorged  · The carotid upstroke is normal in amplitude and contour without delay or bruit  · JVP 9-10 cm H2O  RRR with nl S1 and S2 without m,r,g  · Peripheral pulses are symmetrical and full  · There is no clubbing, cyanosis of the extremities. · Pitting 2+ edema bilaterally from knees down.     · Femoral Arteries: 2+ and equal  · Pedal Pulses: 2+ and equal   Abdomen:  · No masses or tenderness  · Liver/Spleen: No Abnormalities Noted  Neurological/Psychiatric:  · Alert and oriented in all spheres  · Moves all extremities well  · Exhibits normal gait balance and coordination  · No abnormalities of mood, affect, memory, mentation, or behavior are noted  ·   Lab Results   Component Value Date     09/03/2020     10/23/2019     09/03/2018    K 4.1 09/03/2020    K 4.6 10/23/2019    K 3.8 09/03/2018    BUN 15 09/03/2020    BUN 17 10/23/2019    BUN 8 09/03/2018    CREATININE 0.9 09/03/2020    CREATININE 1.0 10/23/2019    CREATININE 0.8 09/03/2018    GLUCOSE 101 09/03/2020    GLUCOSE 122 10/23/2019     Lab Results   Component Value Date    BNP 12 09/14/2018    BNP 37 09/07/2011    BNP 39 05/31/2011     Lab Results   Component Value Date    ALT 42 (H) 09/03/2020    ALT 23 10/23/2019    AST 28 09/03/2020    AST 21 10/23/2019     Lab Results   Component Value Date    HGB 14.6 01/20/2022    HGB 12.1 09/03/2020    HCT 43.4 01/20/2022    HCT 37.0 09/03/2020     01/20/2022     09/03/2020     Lab Results   Component Value Date    TRIG 140 01/20/2022    TRIG 181 10/23/2019    HDL 57 01/20/2022    HDL 91 10/23/2019    HDL 59 05/31/2011    HDL 53 02/08/2010    LDLCALC 94 01/20/2022    LDLCALC 101 10/23/2019      Echo 3/23/17   Low normal left ventricle systolic function with an estimated ejection   fraction of 50%. EF by Cornejo's method is 51%. There is hypokinesis of the inferoseptal and apical inferior walls. Grade I diastolic dysfunction with normal filing pressure. Mild mitral regurgitation. Mild to moderate aortic regurgitation. Systolic pulmonary artery pressure (SPAP) is normal and estimated at 20 mmHg   (RA pressure 3 mmHg). Echo: 04/18/18  Summary   Normal systolic function with an estimated ejection fraction of 55%. No regional wall motion abnormalities are seen. Normal left ventricular diastolic filling pressure. Mild mitral regurgitation. Mild to moderate aortic regurgitation. Systolic pulmonary artery pressure (SPAP) is normal and estimated at 21 mmHg   (RA pressure 3 mmHg). Last echo on 3/23/2017 showed EF of 50%. Echo 2/2/2022:  Summary   Left ventricular systolic function is normal with ejection fraction   estimated at 55%. No regional wall motion abnormalities. There is mild concentric left ventricular hypertrophy. Grade I diastolic dysfunction with normal left ventricular filling pressure. Moderate aortic regurgitation. Mild mitral regurgitation. Systolic pulmonary artery pressure (SPAP) is normal estimated at 19 mmHg   (Right atrial pressure of 3 mmHg). No significant change from exam done 4/18/2018. Labs were reviewed including labs from other hospital systems through [facility]. Cardiac testing was reviewed including echos, nuclear scans, cardiac catheterization, including from other hospital systems through [facility]. Assessment:   ***    Plan:  1. ***        ***    ***    ***        Lea Pink M.D., [facility]

## 2022-05-15 ENCOUNTER — HOSPITAL ENCOUNTER (EMERGENCY)
Age: 62
Discharge: HOME | End: 2022-05-15
Attending: EMERGENCY MEDICINE
Payer: MEDICARE

## 2022-05-15 VITALS
OXYGEN SATURATION: 98 % | BODY MASS INDEX: 38.64 KG/M2 | RESPIRATION RATE: 18 BRPM | TEMPERATURE: 98.4 F | HEIGHT: 62 IN | WEIGHT: 210 LBS | SYSTOLIC BLOOD PRESSURE: 184 MMHG | HEART RATE: 97 BPM | DIASTOLIC BLOOD PRESSURE: 72 MMHG

## 2022-05-15 DIAGNOSIS — I10 ESSENTIAL HYPERTENSION: ICD-10-CM

## 2022-05-15 DIAGNOSIS — S61.217A LACERATION OF LEFT LITTLE FINGER WITHOUT FOREIGN BODY WITHOUT DAMAGE TO NAIL, INITIAL ENCOUNTER: Primary | ICD-10-CM

## 2022-05-15 DIAGNOSIS — Z23 NEED FOR TETANUS BOOSTER: ICD-10-CM

## 2022-05-15 PROCEDURE — 99284 EMERGENCY DEPT VISIT MOD MDM: CPT

## 2022-05-15 PROCEDURE — 90471 IMMUNIZATION ADMIN: CPT | Performed by: EMERGENCY MEDICINE

## 2022-05-15 PROCEDURE — 90715 TDAP VACCINE 7 YRS/> IM: CPT | Performed by: EMERGENCY MEDICINE

## 2022-05-15 PROCEDURE — 6360000002 HC RX W HCPCS: Performed by: EMERGENCY MEDICINE

## 2022-05-15 RX ADMIN — TETANUS TOXOID, REDUCED DIPHTHERIA TOXOID AND ACELLULAR PERTUSSIS VACCINE, ADSORBED 0.5 ML: 5; 2.5; 8; 8; 2.5 SUSPENSION INTRAMUSCULAR at 21:55

## 2022-05-15 ASSESSMENT — PAIN - FUNCTIONAL ASSESSMENT: PAIN_FUNCTIONAL_ASSESSMENT: 0-10

## 2022-05-15 ASSESSMENT — PAIN DESCRIPTION - LOCATION: LOCATION: FINGER (COMMENT WHICH ONE)

## 2022-05-15 ASSESSMENT — PAIN DESCRIPTION - ORIENTATION: ORIENTATION: LEFT

## 2022-05-15 ASSESSMENT — PAIN SCALES - GENERAL: PAINLEVEL_OUTOF10: 10

## 2022-05-16 NOTE — ED PROVIDER NOTES
CHIEF COMPLAINT  Laceration (Pt states that she cut her pinky finger on left hand yesterday. Pt states that she had a pressure dressing on lac until about 1 hour ago.)      [facility]  Ginger Pereyra is a 65 y.o. female presents to the ED with laceration to her left pinky finger, states she cut it yesterday, it bled a lot, her daughter who is a nurse told her to come get it checked out, thinking she may need stitches, but she just wrapped it up in a pressure dressing, and she had taken it off today and it was still oozing blood, so she decided to come in. no blood thinners, no bleeding disorders, no other injuries, can still move the finger but it is painful to bend, she is left-hand dominant. Reports she is newly diagnosed diabetic, just started metformin, does not check her sugars at home yet, no other poor wound healing issues, no other complaints, modifying factors or associated symptoms. I have reviewed the following from the nursing documentation.     Past Medical History:   Diagnosis Date    BIPOLAR     Cardiomyopathy ([facility]     UNKNOWN CAUSE    CHF (congestive heart failure) (HCC)     Fibromyalgia     GERD (gastroesophageal reflux disease)     Hyperlipidemia     Hypertension     Palpitations     Plantar fasciitis left lower extremity    TORN MENISCUS BOTH KNEES     Vitamin D deficiency      Past Surgical History:   Procedure Laterality Date    BREAST ENHANCEMENT SURGERY      CARDIAC DEFIBRILLATOR PLACEMENT Left 2009    Medtronic BiV-ICD     SECTION      x3    COLONOSCOPY      ENDOSCOPY, COLON, DIAGNOSTIC      KNEE SURGERY  10/2011    Left Maniscus    NECK SURGERY      FUSION C5/6 WITH CADAVAR BONES 2002    PACEMAKER PLACEMENT  2020    Generator change- ICD    PACEMAKER PLACEMENT  2014    Bi-V ICD Generator change    TONSILLECTOMY       Family History   Problem Relation Age of Onset    Kidney Disease Mother     Stroke Father     High Blood Pressure Father     Heart Disease Father     High Blood Pressure Brother     Diabetes Brother      Social History     Socioeconomic History    Marital status:      Spouse name: Not on file    Number of children: Not on file    Years of education: Not on file    Highest education level: Not on file   Occupational History    Not on file   Tobacco Use    Smoking status: Former Smoker     Packs/day: 0.99     Years: 33.00     Pack years: 32.67     Quit date: 2014     Years since quittin.4    Smokeless tobacco: Never Used   Substance and Sexual Activity    Alcohol use: Yes     Comment: occ    Drug use: No    Sexual activity: Not Currently   Other Topics Concern    Not on file   Social History Narrative    Not on file     Social Determinants of Health     Financial Resource Strain:     Difficulty of Paying Living Expenses: Not on file   Food Insecurity:     Worried About Running Out of Food in the Last Year: Not on file    Wally of Food in the Last Year: Not on file   Transportation Needs:     Lack of Transportation (Medical): Not on file    Lack of Transportation (Non-Medical):  Not on file   Physical Activity:     Days of Exercise per Week: Not on file    Minutes of Exercise per Session: Not on file   Stress:     Feeling of Stress : Not on file   Social Connections:     Frequency of Communication with Friends and Family: Not on file    Frequency of Social Gatherings with Friends and Family: Not on file    Attends Roman Catholic Services: Not on file    Active Member of Clubs or Organizations: Not on file    Attends Club or Organization Meetings: Not on file    Marital Status: Not on file   Intimate Partner Violence:     Fear of Current or Ex-Partner: Not on file    Emotionally Abused: Not on file    Physically Abused: Not on file    Sexually Abused: Not on file   Housing Stability:     Unable to Pay for Housing in the Last Year: Not on file    Number of [facility] in the Last Year: Not on file    Unstable Housing in the Last Year: Not on file     Current Facility-Administered Medications   Medication Dose Route Frequency Provider Last Rate Last Admin    Tetanus-Diphth-Acell Pertussis (BOOSTRIX) injection 0.5 mL  0.5 mL IntraMUSCular Once Lemon Pilling, DO         Current Outpatient Medications   Medication Sig Dispense Refill    terazosin (HYTRIN) 5 MG capsule Take 1 capsule by mouth daily 90 capsule 3    terazosin (HYTRIN) 5 MG capsule Take 1 capsule by mouth nightly 30 capsule 3    ibuprofen (ADVIL;MOTRIN) 800 MG tablet TAKE 1 TABLET BY MOUTH EVERY EIGHT HOURS AS NEEDED      Fluticasone furoate-vilanterol (BREO ELLIPTA) 200-25 MCG/INH AEPB inhaler Inhale 1 puff into the lungs daily 3 each 3    albuterol sulfate HFA (PROVENTIL HFA) 108 (90 Base) MCG/ACT inhaler Inhale 2 puffs into the lungs every 4 hours as needed for Wheezing or Shortness of Breath 3 Inhaler 3    atorvastatin (LIPITOR) 20 MG tablet TAKE ONE TABLET BY MOUTH DAILY 90 tablet 1    carvedilol (COREG) 25 MG tablet Take 1 tablet by mouth 2 times daily (with meals) 180 tablet 3    valsartan (DIOVAN) 320 MG tablet Take 1 tablet by mouth daily 90 tablet 3    amLODIPine (NORVASC) 5 MG tablet Take 1 tablet by mouth 2 times daily 180 tablet 3    omeprazole (PRILOSEC) 20 MG delayed release capsule Take 20 mg by mouth daily      topiramate (TOPAMAX) 100 MG tablet Take 100 mg by mouth daily      levothyroxine (SYNTHROID) 25 MCG tablet Take 25 mcg by mouth Daily. Allergies   Allergen Reactions    Tetracyclines & Related Nausea And Vomiting    Erythromycin Base      nausea    Lisinopril      Flu like symptoms    Nsaids Nausea Only    Trazodone Other (See Comments)     Hyperactivity   jitteriness       REVIEW OF SYSTEMS  10 systems reviewed, pertinent positives per HPI otherwise noted to be negative.     PHYSICAL EXAM  BP (!) 184/72   Pulse 97   Temp 98.4 °F (36.9 °C) (Oral)   Resp 18   Ht 5' 2\" (1.575 m)   Wt 210 lb (95.3 kg)   SpO2 98%   BMI 38.41 kg/m²   GENERAL APPEARANCE: Awake and alert. Cooperative. No acute distress  HEAD: Normocephalic. Atraumatic. EYES: PERRL. EOM's grossly intact. ENT: Mucous membranes are moist.  Airway patent, no stridor  NECK: Supple. No rigidity  HEART: RRR. No murmurs  LUNGS: Respirations unlabored. Lungs are clear to auscultation bilaterally. EXTREMITIES: No peripheral edema. Moves all extremities equally. All extremities neurovascularly intact. Left hand: 2+ radial pulse, distal sensation intact in all digits, less than 2-second cap refill in all digits, see photo below of laceration, approximately 1.5 cm in length, no active bleeding, no evidence of foreign body, no nail injury, no gross contamination, no visible tendon injury, normal tendon exam, full flexion/extension of DIP and PIP joints, some tenderness to palpation over the laceration area  SKIN: Warm and dry. No acute rashes. NEUROLOGICAL: Alert and oriented. No gross facial drooping. Normal speech, steady gait  PSYCHIATRIC: Normal mood and affect. ED COURSE/MDM  Patient seen and evaluated. Old records reviewed. Labs and imaging reviewed and results discussed with patient.    58-year-old female with small laceration to the left pinky, no active bleeding here, tendon exam intact, cleaned and dressed, placed in splint for comfort/protection, but explained she would not need this once the laceration begins to heal, discussed wound care at home, updated tetanus booster, she declined need for anything else for pain, since this was over 24 hours ago, she was aware that we would not want to close it/suture due to infection risk, no procedural laceration repair required, encourage primary care follow-up, blood pressure was a little elevated here, she is on multiple medications for hypertension, strict return precautions given, all questions answered, will return if any worsening symptoms or new concerns, see AVS for further discharge information, patient verbalized understanding of plan, felt comfortable going home. Orders Placed This Encounter   Procedures    Application finger splint dynamic     Orders Placed This Encounter   Medications    Tetanus-Diphth-Acell Pertussis (BOOSTRIX) injection 0.5 mL          CLINICAL IMPRESSION  1. Laceration of left little finger without foreign body without damage to nail, initial encounter    2. Need for tetanus booster    3. Essential hypertension        Blood pressure (!) 184/72, pulse 97, temperature 98.4 °F (36.9 °C), temperature source Oral, resp. rate 18, height 5' 2\" (1.575 m), weight 210 lb (95.3 kg), SpO2 98 %. DISPOSITION  Hannah Romero was discharged to home in stable condition.                    Dinora Caban DO  05/16/22 [id number]

## 2022-05-17 ENCOUNTER — NURSE ONLY (OUTPATIENT)
Dept: CARDIOLOGY CLINIC | Age: 62
End: 2022-05-17
Payer: MEDICARE

## 2022-05-17 ENCOUNTER — OFFICE VISIT (OUTPATIENT)
Dept: CARDIOLOGY CLINIC | Age: 62
End: 2022-05-17

## 2022-05-17 VITALS
HEART RATE: 79 BPM | SYSTOLIC BLOOD PRESSURE: 136 MMHG | BODY MASS INDEX: 38.64 KG/M2 | OXYGEN SATURATION: 97 % | HEIGHT: 62 IN | DIASTOLIC BLOOD PRESSURE: 70 MMHG | WEIGHT: 210 LBS

## 2022-05-17 DIAGNOSIS — Z01.818 PREOP EXAMINATION: Primary | ICD-10-CM

## 2022-05-17 DIAGNOSIS — I10 ESSENTIAL HYPERTENSION: ICD-10-CM

## 2022-05-17 DIAGNOSIS — Z95.810 CARDIAC RESYNCHRONIZATION THERAPY DEFIBRILLATOR (CRT-D) IN PLACE: ICD-10-CM

## 2022-05-17 DIAGNOSIS — I50.22 SYSTOLIC CHF, CHRONIC (HCC): ICD-10-CM

## 2022-05-17 DIAGNOSIS — I42.8 NON-ISCHEMIC CARDIOMYOPATHY (HCC): ICD-10-CM

## 2022-05-17 DIAGNOSIS — E78.00 PURE HYPERCHOLESTEROLEMIA: ICD-10-CM

## 2022-05-17 DIAGNOSIS — Z95.810 ICD (IMPLANTABLE CARDIOVERTER-DEFIBRILLATOR) IN PLACE: ICD-10-CM

## 2022-05-17 DIAGNOSIS — E55.9 VITAMIN D INSUFFICIENCY: ICD-10-CM

## 2022-05-17 PROCEDURE — 99214 OFFICE O/P EST MOD 30 MIN: CPT | Performed by: NURSE PRACTITIONER

## 2022-05-17 PROCEDURE — 93290 INTERROG DEV EVAL ICPMS IP: CPT | Performed by: NURSE PRACTITIONER

## 2022-05-17 RX ORDER — MONTELUKAST SODIUM 4 MG/1
TABLET, CHEWABLE ORAL
COMMUNITY
Start: 2022-05-06

## 2022-05-17 RX ORDER — CARVEDILOL 25 MG/1
25 TABLET ORAL 2 TIMES DAILY WITH MEALS
Qty: 180 TABLET | Refills: 3 | Status: SHIPPED | OUTPATIENT
Start: 2022-05-17

## 2022-05-17 RX ORDER — LANOLIN ALCOHOL/MO/W.PET/CERES
CREAM (GRAM) TOPICAL
COMMUNITY
Start: 2022-05-10

## 2022-05-17 NOTE — LETTER
[address] Cardiology - [address] [place] [address] [facility]  Phone: [phone]  Fax: [phone]    Thelda Credit, SARAI - [place]        May 17, 2022    [address]  [facility] [address]     1960      Okay to proceed with non-cardiac surgery without any additional cardiac testing at this time at low cardiac risk. If you have any questions or concerns, please don't hesitate to call.     Sincerely,        Thelda Credit, APRN - CNP

## 2022-05-17 NOTE — PROGRESS NOTES
LIBIA from office visit with [facility] for Optivol check. Patient has a history of NICM. Takes Coreg. Last Optivol check 4/11. TI near baseline. Device measurements within normal limits. See Paceart report under the Cardiology tab.

## 2022-05-17 NOTE — PATIENT INSTRUCTIONS
Plan:   1. Continue current regimen  2. Okay to proceed with non-cardiac surgery without any additional cardiac testing at this time at low cardiac risk. 3. Follow up with EP team for device management  4.  Follow up with Dr. Kristin Campbell 6 months

## 2022-05-17 NOTE — PROGRESS NOTES
[address]  Cardiac Follow-up    Primary Care Doctor:  Latha Gilliam MD    Chief Complaint   Patient presents with    Follow-up    Cardiac Clearance        History of Present Illness:  I had the pleasure of seeing Harper Prince for cardiac clearance for total right knee replacement on 22. She has a history of non-ischemic cardiomyopathy, LBBB, CRT-D. Just diagnosed with pre-diatbetes, just started metformin. She feels good. No shortness of breath. No chest pain. No syncope. No edema. Her most recent echocardiogram shows normal EF. Complaint with medications. Harper Prince denies chest pain, dyspnea, palpitations, orthopnea, fatigue, edema, syncope, bleeding. Past Medical History:   has a past medical history of BIPOLAR, Cardiomyopathy ([address] CHF (congestive heart failure) ([address] Fibromyalgia, GERD (gastroesophageal reflux disease), Hyperlipidemia, Hypertension, Palpitations, Plantar fasciitis, TORN MENISCUS BOTH KNEES, and Vitamin D deficiency. Surgical History:   has a past surgical history that includes Tonsillectomy;  section; Neck surgery; Breast enhancement surgery; Colonoscopy; Endoscopy, colon, diagnostic; knee surgery (10/2011); Cardiac defibrillator placement (Left, 2009); pacemaker placement (2020); and pacemaker placement (2014). Social History:   reports that she quit smoking about 7 years ago. She has a 32.67 pack-year smoking history. She has never used smokeless tobacco. She reports current alcohol use. She reports that she does not use drugs. Family History:   Family History   Problem Relation Age of Onset    Kidney Disease Mother     Stroke Father     High Blood Pressure Father     Heart Disease Father     High Blood Pressure Brother     Diabetes Brother      Home Medications:  Prior to Admission medications    Medication Sig Start Date End Date Taking?  Authorizing Provider   terazosin (HYTRIN) 5 MG capsule Take 1 capsule by mouth daily 2/1/22   Nicky Lynn MD   terazosin (HYTRIN) 5 MG capsule Take 1 capsule by mouth nightly 1/20/22   Nicky Lynn MD   ibuprofen (ADVIL;MOTRIN) 800 MG tablet TAKE 1 TABLET BY MOUTH EVERY EIGHT HOURS AS NEEDED 11/8/21   Historical Provider, MD   Fluticasone furoate-vilanterol (BREO ELLIPTA) 200-25 MCG/INH AEPB inhaler Inhale 1 puff into the lungs daily 8/25/21   Janee Brown MD   albuterol sulfate HFA (PROVENTIL HFA) 108 (90 Base) MCG/ACT inhaler Inhale 2 puffs into the lungs every 4 hours as needed for Wheezing or Shortness of Breath 8/25/21 8/25/22  Janee Brown MD   atorvastatin (LIPITOR) 20 MG tablet TAKE ONE TABLET BY MOUTH DAILY 12/8/20   Nicky Lynn MD   carvedilol (COREG) 25 MG tablet Take 1 tablet by mouth 2 times daily (with meals) 11/24/20   Nicky Lynn MD   valsartan (DIOVAN) 320 MG tablet Take 1 tablet by mouth daily 11/24/20   Nicky Lynn MD   amLODIPine (NORVASC) 5 MG tablet Take 1 tablet by mouth 2 times daily 11/24/20   Nicky Lynn MD   omeprazole (PRILOSEC) 20 MG delayed release capsule Take 20 mg by mouth daily    Historical Provider, MD   topiramate (TOPAMAX) 100 MG tablet Take 100 mg by mouth daily    Historical Provider, MD   levothyroxine (SYNTHROID) 25 MCG tablet Take 25 mcg by mouth Daily.     Historical Provider, MD      Allergies:  Tetracyclines & related, Erythromycin base, Lisinopril, Nsaids, and Trazodone     Physical Examination:    Vitals:    05/17/22 1422   BP: 136/70   Pulse: 79   SpO2: 97%   Weight: 210 lb (95.3 kg)   Height: 5' 2\" (1.575 m)        Constitutional and General Appearance: no apparent distress  HEENT: non-icteric sclera, mask in place   Neck: JVP less than 8 cm H20  Respiratory:  · No use of accessory muscles  · Clear breath sounds throughout, no wheezing, no crackles, no rhonchi  Cardiovascular:  no murmur/rub/gallop  · Regular rate and rhythm, S1,S2 normal  · Radial pulses 2+ and equal bilaterally  · No edema  · Pedal Pulses: 2+ and equal   Abdomen:  · No masses or tenderness  · Liver: No Abnormalities Noted  Musculoskeletal/Skin:  · Exhibits normal gait balance and coordination  · There is no clubbing, cyanosis of the extremities  · Skin is warm and dry  · Moves all extremities well  Neurological/Psychiatric:  · Alert and oriented in all spheres  · No abnormalities of mood, affect, memory, mentation, or behavior are noted    Lab Data reviewed and analyzed   CBC:   WBC   Date Value Ref Range Status   01/20/2022 9.6 4.0 - 11.0 K/uL Final   09/03/2020 12.5 (H) 4.0 - 11.0 K/uL Final   10/23/2019 8.6 4.0 - 11.0 K/uL Final     RBC   Date Value Ref Range Status   01/20/2022 4.67 4.00 - 5.20 M/uL Final   09/03/2020 3.80 (L) 4.00 - 5.20 M/uL Final   10/23/2019 4.44 4.00 - 5.20 M/uL Final     Hemoglobin   Date Value Ref Range Status   01/20/2022 14.6 12.0 - 16.0 g/dL Final   09/03/2020 12.1 12.0 - 16.0 g/dL Final   10/23/2019 14.5 12.0 - 16.0 g/dL Final     Hematocrit   Date Value Ref Range Status   01/20/2022 43.4 36.0 - 48.0 % Final   09/03/2020 37.0 36.0 - 48.0 % Final   10/23/2019 43.4 36.0 - 48.0 % Final     MCV   Date Value Ref Range Status   01/20/2022 92.9 80.0 - 100.0 fL Final   09/03/2020 97.5 80.0 - 100.0 fL Final   10/23/2019 97.9 80.0 - 100.0 fL Final     RDW   Date Value Ref Range Status   01/20/2022 13.5 12.4 - 15.4 % Final   09/03/2020 13.2 12.4 - 15.4 % Final   10/23/2019 13.2 12.4 - 15.4 % Final     Platelets   Date Value Ref Range Status   01/20/2022 247 135 - 450 K/uL Final   09/03/2020 215 135 - 450 K/uL Final   10/23/2019 210 135 - 450 K/uL Final     Iron:    Iron   Date Value Ref Range Status   09/07/2011 48 35 - 150 ug/dl Final     TIBC   Date Value Ref Range Status   09/07/2011 380 260 - 445 ug/dl Final     BMP:   Sodium   Date Value Ref Range Status   09/03/2020 141 136 - 145 mmol/L Final   10/23/2019 140 136 - 145 mmol/L Final   09/03/2018 143 136 - 145 mmol/L Final     Potassium   Date Value Ref Range Status 09/03/2020 4.1 3.5 - 5.1 mmol/L Final   10/23/2019 4.6 3.5 - 5.1 mmol/L Final   09/03/2018 3.8 3.5 - 5.1 mmol/L Final     Chloride   Date Value Ref Range Status   09/03/2020 106 99 - 110 mmol/L Final   10/23/2019 104 99 - 110 mmol/L Final   09/03/2018 105 99 - 110 mmol/L Final     CO2   Date Value Ref Range Status   09/03/2020 24 21 - 32 mmol/L Final   10/23/2019 23 21 - 32 mmol/L Final   09/03/2018 24 21 - 32 mmol/L Final     BUN   Date Value Ref Range Status   09/03/2020 15 7 - 20 mg/dL Final   10/23/2019 17 7 - 20 mg/dL Final   09/03/2018 8 7 - 20 mg/dL Final     CREATININE   Date Value Ref Range Status   09/03/2020 0.9 0.6 - 1.1 mg/dL Final   10/23/2019 1.0 0.6 - 1.1 mg/dL Final   09/03/2018 0.8 0.6 - 1.1 mg/dL Final     BNP:   Lab Results   Component Value Date    PROBNP 478 (H) 01/20/2022    PROBNP 245 (H) 10/23/2019    PROBNP 91 03/28/2018     Lipids: No components found for: T                Triglycerides   Date Value Ref Range Status   01/20/2022 140 0 - 150 mg/dL Final                   HDL   Date Value Ref Range Status   01/20/2022 57 40 - 60 mg/dL Final                   LDL Calculated   Date Value Ref Range Status   01/20/2022 94 <100 mg/dL Final                   VLDL Cholesterol Calculated   Date Value Ref Range Status   01/20/2022 28 Not Established mg/dL Final                 No results found for: CHOLHDLRATIO    EF:   Lab Results   Component Value Date    LVEF 55 02/02/2022    LVEF 55 04/18/2018     Testing reviewed:    Echo 3/23/17   Low normal left ventricle systolic function with an estimated ejection   fraction of 50%.   EF by Cornejo's method is 51%.   There is hypokinesis of the inferoseptal and apical inferior walls.   Grade I diastolic dysfunction with normal filing pressure.   Mild mitral regurgitation.   Mild to moderate aortic regurgitation.   Systolic pulmonary artery pressure (SPAP) is normal and estimated at 20 mmHg   (RA pressure 3 mmHg).      Echo: 04/18/18  Summary   Normal

## 2022-05-17 NOTE — RESULT ENCOUNTER NOTE
Discussed with EP. Patient has BiV adaptive pacing ability and at times will pace from the LV only, noting changes in the length of the QRS.

## 2022-07-11 ENCOUNTER — NURSE ONLY (OUTPATIENT)
Dept: CARDIOLOGY CLINIC | Age: 62
End: 2022-07-11
Payer: MEDICARE

## 2022-07-11 DIAGNOSIS — I50.22 SYSTOLIC CHF, CHRONIC (HCC): ICD-10-CM

## 2022-07-11 DIAGNOSIS — Z95.810 CARDIAC RESYNCHRONIZATION THERAPY DEFIBRILLATOR (CRT-D) IN PLACE: ICD-10-CM

## 2022-07-11 DIAGNOSIS — I42.8 NON-ISCHEMIC CARDIOMYOPATHY (HCC): ICD-10-CM

## 2022-07-11 PROCEDURE — 93297 REM INTERROG DEV EVAL ICPMS: CPT | Performed by: NURSE PRACTITIONER

## 2022-07-11 PROCEDURE — 93296 REM INTERROG EVL PM/IDS: CPT | Performed by: INTERNAL MEDICINE

## 2022-07-11 PROCEDURE — 93295 DEV INTERROG REMOTE 1/2/MLT: CPT | Performed by: INTERNAL MEDICINE

## 2022-07-13 ENCOUNTER — TELEPHONE (OUTPATIENT)
Dept: CARDIOLOGY CLINIC | Age: 62
End: 2022-07-13

## 2022-07-13 NOTE — PROGRESS NOTES
Remote transmission received for patients CRT-D. Transmission shows normal sensing and pacing function. EP physician will review. See interrogation under the cardiology tab in the [address] [place] [address] [address] field for more details. Will continue to monitor remotely. No  arrhythmias recorded. Optivol is elevated. Will send results to Shaun Shone NP to review. End of 91-day monitoring period 7-11-22.

## 2022-07-13 NOTE — TELEPHONE ENCOUNTER
Re: optivol    ----- Message from SARAI Browne CNP sent at 7/13/2022  8:56 AM EDT -----  Reviewed. Her fluid levels are increased on her device check. It appears she is not on any diuretic therapy. Recommend patient stick with 2 L fluid restriction.   If she has had increased and her weight gain edema or shortness of breath please let me know as we may need to start her on a water pill

## 2022-07-13 NOTE — RESULT ENCOUNTER NOTE
Reviewed. Her fluid levels are increased on her device check. It appears she is not on any diuretic therapy. Recommend patient stick with 2 L fluid restriction.   If she has had increased and her weight gain edema or shortness of breath please let me know as we may need to start her on a water pill

## 2022-07-14 NOTE — TELEPHONE ENCOUNTER
Pt called i, read nprb message, pt MARYANNE. pt stated she has had SOB but is unsure if its heart or asthma. Stated stomach is swollen but weight has been steady. Please advise.

## 2022-08-17 ENCOUNTER — OFFICE VISIT (OUTPATIENT)
Dept: CARDIOLOGY CLINIC | Age: 62
End: 2022-08-17
Payer: MEDICARE

## 2022-08-17 ENCOUNTER — NURSE ONLY (OUTPATIENT)
Dept: CARDIOLOGY CLINIC | Age: 62
End: 2022-08-17
Payer: MEDICARE

## 2022-08-17 VITALS
HEIGHT: 62 IN | DIASTOLIC BLOOD PRESSURE: 100 MMHG | HEART RATE: 70 BPM | SYSTOLIC BLOOD PRESSURE: 160 MMHG | BODY MASS INDEX: 38.37 KG/M2 | OXYGEN SATURATION: 99 % | WEIGHT: 208.5 LBS

## 2022-08-17 DIAGNOSIS — R00.2 PALPITATIONS: Primary | ICD-10-CM

## 2022-08-17 DIAGNOSIS — I50.22 SYSTOLIC CHF, CHRONIC (HCC): ICD-10-CM

## 2022-08-17 DIAGNOSIS — R00.2 PALPITATIONS: ICD-10-CM

## 2022-08-17 DIAGNOSIS — Z45.02 ICD (IMPLANTABLE CARDIOVERTER-DEFIBRILLATOR) BATTERY DEPLETION: Primary | ICD-10-CM

## 2022-08-17 DIAGNOSIS — Z45.02 ICD (IMPLANTABLE CARDIOVERTER-DEFIBRILLATOR) BATTERY DEPLETION: ICD-10-CM

## 2022-08-17 DIAGNOSIS — I42.8 NON-ISCHEMIC CARDIOMYOPATHY (HCC): ICD-10-CM

## 2022-08-17 DIAGNOSIS — I44.7 LBBB (LEFT BUNDLE BRANCH BLOCK): ICD-10-CM

## 2022-08-17 PROCEDURE — 93284 PRGRMG EVAL IMPLANTABLE DFB: CPT | Performed by: INTERNAL MEDICINE

## 2022-08-17 PROCEDURE — 99214 OFFICE O/P EST MOD 30 MIN: CPT | Performed by: INTERNAL MEDICINE

## 2022-08-17 PROCEDURE — 93290 INTERROG DEV EVAL ICPMS IP: CPT | Performed by: INTERNAL MEDICINE

## 2022-08-17 NOTE — PROGRESS NOTES
[facility]   Cardiac Consultation    Date: 22  Patient Name: Arielle Ivy  YOB: 1960    Primary Care Physician: Hermilo Saldaña MD    CHIEF COMPLAINT:   Chief Complaint   Patient presents with    Follow-up    Device Check    Other     LBBB     HPI:  Arielle Ivy is a 65 y.o. female who presents today referred by my colleague Dr Anabella Weller for non ischemic cardiomyopathy and LBBB. She had a Bi-V ICD placed by Dr Megan Schneider in  with generator replacement on 2014. Of note her LHC on 09 showed normal coronaries. Her most recent ECHO was 2022 and demonstrated an EF of 55%, mild MR and mild/mod AR. Today, 2022, Device interrogation demonstrated AP <0.1%,  97.8%, events none, AUSTEN 5.6 years. She reports  she had a total right knee replacement and she is healing well. She states she is here for follow up today for device management after her preoperative office visit. Patient denies current edema, chest pain, sob, palpitations, dizziness or syncope. Patient is taking all cardiac medications as prescribed and tolerates them well. ECG demonstrates effective biventricular pacing. Past Medical History:   has a past medical history of BIPOLAR, Cardiomyopathy ([address] CHF (congestive heart failure) ([address] Fibromyalgia, GERD (gastroesophageal reflux disease), Hyperlipidemia, Hypertension, Palpitations, Plantar fasciitis, TORN MENISCUS BOTH KNEES, and Vitamin D deficiency. Surgical History:   has a past surgical history that includes Tonsillectomy;  section; Neck surgery; Breast enhancement surgery; Colonoscopy; Endoscopy, colon, diagnostic; knee surgery (10/2011); Cardiac defibrillator placement (Left, 2009); pacemaker placement (2020); and pacemaker placement (2014). Social History:   reports that she quit smoking about 7 years ago. Her smoking use included cigarettes.  She has a 32.67 pack-year smoking history. She has never used smokeless tobacco. She reports current alcohol use. She reports that she does not use drugs. Family History:  family history includes Diabetes in her brother; Heart Disease in her father; High Blood Pressure in her brother and father; Kidney Disease in her mother; Stroke in her father. Home Medications:  Outpatient Encounter Medications as of 8/17/2022   Medication Sig Dispense Refill    vitamin B-12 (CYANOCOBALAMIN) 1000 MCG tablet TAKE 1 TABLET BY MOUTH EVERY DAY      metFORMIN (GLUCOPHAGE) 500 MG tablet Take 500 mg by mouth 2 times daily (with meals)      Cholecalciferol 325 MCG (98997 UT) CAPS 1 capsule on Mon, Wed, Fri Must be  from any cholesterol medication by at least 2 hours. carvedilol (COREG) 25 MG tablet Take 1 tablet by mouth 2 times daily (with meals) 180 tablet 3    ibuprofen (ADVIL;MOTRIN) 800 MG tablet TAKE 1 TABLET BY MOUTH EVERY EIGHT HOURS AS NEEDED      Fluticasone furoate-vilanterol (BREO ELLIPTA) 200-25 MCG/INH AEPB inhaler Inhale 1 puff into the lungs daily 3 each 3    albuterol sulfate HFA (PROVENTIL HFA) 108 (90 Base) MCG/ACT inhaler Inhale 2 puffs into the lungs every 4 hours as needed for Wheezing or Shortness of Breath 3 Inhaler 3    atorvastatin (LIPITOR) 20 MG tablet TAKE ONE TABLET BY MOUTH DAILY 90 tablet 1    valsartan (DIOVAN) 320 MG tablet Take 1 tablet by mouth daily 90 tablet 3    amLODIPine (NORVASC) 5 MG tablet Take 1 tablet by mouth 2 times daily 180 tablet 3    omeprazole (PRILOSEC) 20 MG delayed release capsule Take 10 mg by mouth daily       topiramate (TOPAMAX) 100 MG tablet Take 100 mg by mouth daily      levothyroxine (SYNTHROID) 25 MCG tablet Take 25 mcg by mouth Daily. colestipol (COLESTID) 1 g tablet TAKE 1 TABLET BY MOUTH DAILY (Patient not taking: Reported on 8/17/2022)       No facility-administered encounter medications on file as of 8/17/2022.        Allergies:  Tetracyclines & related, Erythromycin base, heart sounds. Pulmonary/Chest: Effort normal and breath sounds normal.   Abdominal: Soft. No tenderness. Musculoskeletal: Normal range of motion. She exhibits no edema. Neurological: She is alert and oriented to person, place, and time. Skin: Skin is warm and dry. Psychiatric: She has a normal mood and affect. Assessment:  1. NICM - normal function of Bi-V ICD, no changes on device settings. Her current V to V setting is in adaptive mode. Her most recent echo demonstrates normal left ventricular function. 2. LBBB - demonstrated on EKG without pacing. 3. CRT-D - EF 02/2022 at 55%      Plan:  1. Continue remote device checks every 3 months  2. Continue current cardiac medications   3. Follow up with [facility] in 1 year      ISalome RN, am scribing for and in the presence of Dr. Alivia Oglesby. 08/17/22 11:50 AM   Salome Harper RN      I, Dr. Alivia Oglesby, personally performed the services described in this documentation as scribed by Salome Harper RN in my presence, and it is both accurate and complete. QUALITY MEASURES  1. Tobacco Cessation Counseling: NA  2. Retake of BP if >140/90:   NA  3. Documentation to PCP/referring for new patient:  Sent to PCP at close of office visit  4. CAD patient on anti-platelet: NA  5. CAD patient on STATIN therapy:  Yes  6.  Patient with CHF and aFib on anticoagulation:NO      Alivia Oglesby M.D.

## 2022-08-17 NOTE — PATIENT INSTRUCTIONS
Plan:  1. Continue remote device checks every 3 months  2. Continue current cardiac medications   3.  Follow up with the EPNP in 6 months and ALIYAB in 1 year

## 2022-08-17 NOTE — PROGRESS NOTES
Patient presents to the device clinic today for a programming evaluation for her defibrillator. Patient has a history of NICM. Takes Coreg. Last device interrogation was on 7/11. Since then, no arrhythmias recorded. P wave: 5.0 mV  R wave: >20.0 mV    AP <0.1%  97.8% Effective 97.8%    All sensing and pacing parameters are within normal range. No changes need to be made at this time. Patient will see Dr. Kaye Ibrahim today in office. Patient education was provided about device functionality, in home monitoring, and any other patient questions and/or concerns were addressed. Patient voices understanding. Patient will follow up in 3 months in office or remotely. Please see interrogation for more detail - Paceart report located under the Cardiology tab.

## 2022-09-07 ENCOUNTER — HOSPITAL ENCOUNTER (OUTPATIENT)
Dept: CT IMAGING | Age: 62
Discharge: HOME | End: 2022-09-07
Payer: MEDICARE

## 2022-09-07 ENCOUNTER — OFFICE VISIT (OUTPATIENT)
Dept: PULMONOLOGY | Age: 62
End: 2022-09-07
Payer: MEDICARE

## 2022-09-07 VITALS
SYSTOLIC BLOOD PRESSURE: 144 MMHG | HEIGHT: 64 IN | OXYGEN SATURATION: 98 % | WEIGHT: 205 LBS | HEART RATE: 96 BPM | BODY MASS INDEX: 35 KG/M2 | DIASTOLIC BLOOD PRESSURE: 72 MMHG

## 2022-09-07 DIAGNOSIS — J45.40 MODERATE PERSISTENT ASTHMA WITHOUT COMPLICATION: ICD-10-CM

## 2022-09-07 DIAGNOSIS — Z87.891 PERSONAL HISTORY OF TOBACCO USE: Primary | ICD-10-CM

## 2022-09-07 DIAGNOSIS — Z87.891 PERSONAL HISTORY OF TOBACCO USE: ICD-10-CM

## 2022-09-07 PROCEDURE — 99213 OFFICE O/P EST LOW 20 MIN: CPT | Performed by: INTERNAL MEDICINE

## 2022-09-07 PROCEDURE — 71271 CT THORAX LUNG CANCER SCR C-: CPT

## 2022-09-07 RX ORDER — FLUTICASONE FUROATE AND VILANTEROL 200; 25 UG/1; UG/1
1 POWDER RESPIRATORY (INHALATION) DAILY
Qty: 3 EACH | Refills: 3 | Status: SHIPPED | OUTPATIENT
Start: 2022-09-07

## 2022-09-07 NOTE — PROGRESS NOTES
PULMONARY, CRITICAL CARE AND SLEEP MEDICINE     CC/REFERRING PROVIDER:  Patient is being seen at the request of Dr. Jovanny Gonzales for a consultation for shortness of breath      Interval History:      doing well with Breo. Uses every day in summer, uses albuterol prior to going out on hot/humid days. Uses breo 2-3 X per week in wintertime. PRESENTING HPI: 62 yo WF with non ischemic cardiomyopathy, over the winter 2018 debilitating shortness of breath and cough, lost voice. Over the last few weeks, with central air much less coughing and breathing is better. Over the last few days, coughing more at night. No family history of asthma, but did have asthma as a child. Of note, she cannot take a single big breath without coughing, she cannot exercise at all without shortness of breath and cough. reports that she quit smoking about 7 years ago. Her smoking use included cigarettes. She has a 32.67 pack-year smoking history. She has never used smokeless tobacco.  Just less than 1 ppd X 35 years       PHYSICAL EXAM:  Blood pressure (!) 144/72, pulse 96, height 5' 4\" (1.626 m), weight 205 lb (93 kg), SpO2 98 %.'  Constitutional:  No acute distress. HENT:  Oropharynx is clear and moist.   Neck: No tracheal deviation present. Cardiovascular: Normal heart sounds. No lower extremity edema. Pulmonary/Chest: No wheezes. No rhonchi. No rales. No decreased breath sounds. No accessory muscle usage or stridor. Musculoskeletal: No cyanosis. No clubbing. Skin: Skin is warm and dry. Psychiatric: Normal mood and affect.   Neurologic: speech fluent, alert and oriented, strength symmetric      DATA:  LDCT 9/7/22     Impression   Underlying emphysema with stable tiny noncalcified pulmonary nodules     PFT 10/31/18 FEV1 2.42 L 92% TLC 4.98 L 95%  DLCO 18.64 79%    ASSESSMENT:  Moderate persistent asthma -very well controlled    Not addressed today  Pulmonary Nodules   Non ischemic cardiomyopathy, f/b Dr. Ben Montgomery

## 2022-10-10 ENCOUNTER — NURSE ONLY (OUTPATIENT)
Dept: CARDIOLOGY CLINIC | Age: 62
End: 2022-10-10
Payer: MEDICARE

## 2022-10-10 DIAGNOSIS — I50.22 SYSTOLIC CHF, CHRONIC (HCC): ICD-10-CM

## 2022-10-10 DIAGNOSIS — I42.8 NON-ISCHEMIC CARDIOMYOPATHY (HCC): Primary | ICD-10-CM

## 2022-10-10 DIAGNOSIS — Z95.810 CARDIAC RESYNCHRONIZATION THERAPY DEFIBRILLATOR (CRT-D) IN PLACE: ICD-10-CM

## 2022-10-10 PROCEDURE — 93296 REM INTERROG EVL PM/IDS: CPT | Performed by: INTERNAL MEDICINE

## 2022-10-10 PROCEDURE — 93297 REM INTERROG DEV EVAL ICPMS: CPT | Performed by: INTERNAL MEDICINE

## 2022-10-10 PROCEDURE — 93295 DEV INTERROG REMOTE 1/2/MLT: CPT | Performed by: INTERNAL MEDICINE

## 2022-10-13 NOTE — PROGRESS NOTES
We received remote transmission from patient's monitor at home. Transmission shows normal sensing and pacing function. EP physician will review. See interrogation under cardiology tab in the [address] [facility] [address] field for more details. Optivol is slightly elevated. HF team to review. Total * 98.4% (MVP Off)  Effective 98.4%  AS-VS 1.3%  AS- 98.7%  AP-VS < 0.1%  AP- < 0.1%    End of 91-day monitoring period 10-10-22.

## 2023-02-06 NOTE — PROGRESS NOTES
[facility]   Advanced Heart Failure/Pulmonary Hypertension  Cardiac Follow up       Laurie De León  YOB: 1960    Date of Visit:  2/7/23    Chief Complaint   Patient presents with    Follow-up    Congestive Heart Failure         History of Present Illness:  Laurie De León originally presented for follow up of her nonischemic cardiomyopathy with LBBB. She had a RHC 1/7/09 which showed normal coronaries and normal cardiac output. She was told years ago that her EF was 20-25%. 8/08 she had an angiogram that showed normal coronaries and EF 30%. However, MUGA scan revealed EF 61%. June, 2009 she had placement of bi-v defibrillator, and has had no shocks. Echo 10/09 showed EF 40-45% and decrease in left ventricular size. She had an echo today that showed EF 50%. As part of her history was told she has a slight underactive thyroid. Her echo from 3/23/17 shows an EF of 50-55%. Her device check from 3/23/17 shows normal device sensing and function with normal fluid level on her Optivol. She lost her mother to lung cancer in September 2017. Her echo from 04/18/18 showed her EF was 55%. Mild MR. Mild to moderate AR. She underwent a generator change on 9/3/2020 with Dr. Leighann Norton. OV, 1/18/2022, BP at home was 197/101 after taking morning medications. She was started on terazosin for high blood pressure. Echo 2/2/2022 EF 55%, moderate, AR, mild MR, grade I DD. Device check 2/7/2023 shows no arrhythmias since last device interrogation 10/10/2022. Today, 2/7/2023 she says she is doing well. She says she had her right knee replaced last summer. She is having her left knee replaced this summer. She tested positive on her cologuard and is having a colonoscopy next week. She follows with primary care for diabetes and weight management. She is considering starting ozempic. She is not taking terazosin because it caused herto  lose control of her bladder.  Patient is taking all cardiac medications as prescribed and tolerates them well. Patient denies current edema, chest pain, sob, palpitations, dizziness or syncope. Allergies   Allergen Reactions    Tetracyclines & Related Nausea And Vomiting    Erythromycin Base      nausea    Lisinopril      Flu like symptoms    Nsaids Nausea Only    Trazodone Other (See Comments)     Hyperactivity   jitteriness     Current Outpatient Medications   Medication Sig Dispense Refill    Fluticasone furoate-vilanterol (BREO ELLIPTA) 200-25 MCG/INH AEPB inhaler Inhale 1 puff into the lungs daily 3 each 3    metFORMIN (GLUCOPHAGE) 500 MG tablet Take 500 mg by mouth 2 times daily (with meals)      carvedilol (COREG) 25 MG tablet Take 1 tablet by mouth 2 times daily (with meals) 180 tablet 3    ibuprofen (ADVIL;MOTRIN) 800 MG tablet TAKE 1 TABLET BY MOUTH EVERY EIGHT HOURS AS NEEDED      albuterol sulfate HFA (PROVENTIL HFA) 108 (90 Base) MCG/ACT inhaler Inhale 2 puffs into the lungs every 4 hours as needed for Wheezing or Shortness of Breath 3 Inhaler 3    atorvastatin (LIPITOR) 20 MG tablet TAKE ONE TABLET BY MOUTH DAILY 90 tablet 1    valsartan (DIOVAN) 320 MG tablet Take 1 tablet by mouth daily 90 tablet 3    amLODIPine (NORVASC) 5 MG tablet Take 1 tablet by mouth 2 times daily 180 tablet 3    omeprazole (PRILOSEC) 20 MG delayed release capsule Take 40 mg by mouth daily       No current facility-administered medications for this visit.        Immunization History   Administered Date(s) Administered    COVID-19, PFIZER PURPLE top, DILUTE for use, (age 15 y+), 30mcg/0.3mL 05/14/2021, 06/05/2021    Influenza Vaccine, unspecified formulation 09/14/2018    Influenza, AFLURIA (age 1 yrs+), FLUZONE, (age 10 mo+), MDV, 0.5mL 09/12/2008, 10/31/2018    Influenza, FLUARIX, FLULAVAL, FLUZONE (age 10 mo+) AND AFLURIA, (age 1 y+), PF, 0.5mL 09/14/2018    Pneumococcal Polysaccharide ([id number]) 09/08/2011, 09/14/2018    Tdap (Boostrix, Adacel) 08/01/2016, 05/15/2022 Patient Active Problem List   Diagnosis    Hypertension    Hyperlipidemia    Palpitations    Systolic CHF, chronic (HCC)    Medtronic CRT-D w/ optivol-Gen change 14 and 9/3/20 (leads 09)    SOB (shortness of breath)    Non-ischemic cardiomyopathy (HCC)    LBBB (left bundle branch block)    ICD (implantable cardioverter-defibrillator) battery depletion       Past Medical History:   Diagnosis Date    BIPOLAR     Cardiomyopathy ([address]     UNKNOWN CAUSE    CHF (congestive heart failure) (HCC)     Fibromyalgia     GERD (gastroesophageal reflux disease)     Hyperlipidemia     Hypertension     Palpitations     Plantar fasciitis left lower extremity    TORN MENISCUS BOTH KNEES     Vitamin D deficiency      Past Surgical History:   Procedure Laterality Date    BREAST ENHANCEMENT SURGERY      CARDIAC DEFIBRILLATOR PLACEMENT Left 2009    Medtronic BiV-ICD     SECTION      x3    COLONOSCOPY      ENDOSCOPY, COLON, DIAGNOSTIC      KNEE SURGERY  10/2011    Left Maniscus    NECK SURGERY      FUSION C5/6 WITH CADAVAR BONES 2002    PACEMAKER PLACEMENT  2020    Generator change- ICD    PACEMAKER PLACEMENT  2014    Bi-V ICD Generator change    TONSILLECTOMY       Family History   Problem Relation Age of Onset    Kidney Disease Mother     Stroke Father     High Blood Pressure Father     Heart Disease Father     High Blood Pressure Brother     Diabetes Brother      Social History     Socioeconomic History    Marital status:      Spouse name: Not on file    Number of children: Not on file    Years of education: Not on file    Highest education level: Not on file   Occupational History    Not on file   Tobacco Use    Smoking status: Former     Packs/day: 0.99     Years: 33.00     Pack years: 32.67     Types: Cigarettes     Quit date: 2014     Years since quittin.1    Smokeless tobacco: Never   Vaping Use    Vaping Use: Never used   Substance and Sexual Activity    Alcohol use:  Yes Comment: occ    Drug use: No    Sexual activity: Not Currently   Other Topics Concern    Not on file   Social History Narrative    Not on file     Social Determinants of Health     Financial Resource Strain: Not on file   Food Insecurity: Not on file   Transportation Needs: Not on file   Physical Activity: Not on file   Stress: Not on file   Social Connections: Not on file   Intimate Partner Violence: Not on file   Housing Stability: Not on file       Review of Systems:   Constitutional: there has been no unanticipated weight loss. There's been no change in energy level, sleep pattern, or activity level. Eyes: No visual changes or diplopia. No scleral icterus. ENT: No Headaches, hearing loss or vertigo. No mouth sores or sore throat. Cardiovascular: No chest pain or palpitations but worsening edema. Respiratory: SOL worsening   Gastrointestinal: No abdominal pain, appetite loss, blood in stools. No change in bowel or bladder habits. Genitourinary: No dysuria, trouble voiding, or hematuria. Musculoskeletal:  No gait disturbance, weakness or joint complaints. Integumentary: No rash or pruritis. Neurological: No headache, diplopia, change in muscle strength, numbness or tingling. No change in gait, balance, coordination, mood, affect, memory, mentation, behavior. Psychiatric: No anxiety, no depression. Endocrine: No malaise, fatigue or temperature intolerance. No excessive thirst, fluid intake, or urination. No tremor. Hematologic/Lymphatic: No abnormal bruising or bleeding, blood clots or swollen lymph nodes. Allergic/Immunologic: No nasal congestion or hives. Physical Examination:    Vitals:    02/07/23 1141 02/07/23 1147   BP: (!) 152/70 (!) 150/70   Pulse: 79    SpO2: 96%    Weight: 213 lb (96.6 kg)    Height: 5' 4\" (1.626 m)        Body mass index is 36.56 kg/m².      Wt Readings from Last 3 Encounters:   02/07/23 213 lb (96.6 kg)   09/07/22 205 lb (93 kg)   08/17/22 208 lb 8 oz (94.6 kg) BP Readings from Last 3 Encounters:   02/07/23 (!) 150/70   09/07/22 (!) 144/72   08/17/22 (!) 160/100          Constitutional and General Appearance:   WD/WN in NAD, pale appearing  HEENT:  NC/AT  Respiratory:  Normal excursion and expansion without use of accessory muscles  Resp Auscultation: Normal breath sounds without dullness  Cardiovascular: The apical impulses not displaced  Heart tones are crisp and normal  Cervical veins are not engorged  The carotid upstroke is normal in amplitude and contour without delay or bruit  JVP 9-10 cm H2O  RRR with nl S1 and S2 without m,r,g  Peripheral pulses are symmetrical and full  There is no clubbing, cyanosis of the extremities. Pitting 2+ edema bilaterally from knees down.     Femoral Arteries: 2+ and equal  Pedal Pulses: 2+ and equal   Abdomen:  No masses or tenderness  Liver/Spleen: No Abnormalities Noted  Neurological/Psychiatric:  Alert and oriented in all spheres  Moves all extremities well  Exhibits normal gait balance and coordination  No abnormalities of mood, affect, memory, mentation, or behavior are noted    Lab Results   Component Value Date/Time     09/03/2020 11:20 AM     10/23/2019 10:57 AM     09/03/2018 02:56 PM    K 4.1 09/03/2020 11:20 AM    K 4.6 10/23/2019 10:57 AM    K 3.8 09/03/2018 02:56 PM    BUN 15 09/03/2020 11:20 AM    BUN 17 10/23/2019 10:57 AM    BUN 8 09/03/2018 02:56 PM    CREATININE 0.9 09/03/2020 11:20 AM    CREATININE 1.0 10/23/2019 10:57 AM    CREATININE 0.8 09/03/2018 02:56 PM    GLUCOSE 101 09/03/2020 11:20 AM    GLUCOSE 122 10/23/2019 10:57 AM     Lab Results   Component Value Date    BNP 12 09/14/2018    BNP 37 09/07/2011    BNP 39 05/31/2011     Lab Results   Component Value Date    ALT 42 (H) 09/03/2020    ALT 23 10/23/2019    AST 28 09/03/2020    AST 21 10/23/2019     Lab Results   Component Value Date/Time    HGB 14.6 01/20/2022 11:36 AM    HGB 12.1 09/03/2020 11:20 AM    HCT 43.4 01/20/2022 11:36 AM HCT 37.0 09/03/2020 11:20 AM     01/20/2022 11:36 AM     09/03/2020 11:20 AM     Lab Results   Component Value Date/Time    TRIG 140 01/20/2022 11:36 AM    TRIG 181 10/23/2019 10:57 AM    HDL 57 01/20/2022 11:36 AM    HDL 91 10/23/2019 10:57 AM    HDL 59 05/31/2011 09:30 AM    HDL 53 02/08/2010 10:45 AM    LDLCALC 94 01/20/2022 11:36 AM    LDLCALC 101 10/23/2019 10:57 AM      Echo 3/23/17   Low normal left ventricle systolic function with an estimated ejection   fraction of 50%. EF by Cornejo's method is 51%. There is hypokinesis of the inferoseptal and apical inferior walls. Grade I diastolic dysfunction with normal filing pressure. Mild mitral regurgitation. Mild to moderate aortic regurgitation. Systolic pulmonary artery pressure (SPAP) is normal and estimated at 20 mmHg   (RA pressure 3 mmHg). Echo: 04/18/18  Summary   Normal systolic function with an estimated ejection fraction of 55%. No regional wall motion abnormalities are seen. Normal left ventricular diastolic filling pressure. Mild mitral regurgitation. Mild to moderate aortic regurgitation. Systolic pulmonary artery pressure (SPAP) is normal and estimated at 21 mmHg   (RA pressure 3 mmHg). Last echo on 3/23/2017 showed EF of 50%. Echo 2/2/2022   Summary   Left ventricular systolic function is normal with ejection fraction   estimated at 55%. No regional wall motion abnormalities. There is mild concentric left ventricular hypertrophy. Grade I diastolic dysfunction with normal left ventricular filling pressure. Moderate aortic regurgitation. Mild mitral regurgitation. Systolic pulmonary artery pressure (SPAP) is normal estimated at 19 mmHg   (Right atrial pressure of 3 mmHg). No significant change from exam done 4/18/2018. Labs were reviewed including labs from other hospital systems through [facility].   Cardiac testing was reviewed including echos, nuclear scans, cardiac catheterization, including from other hospital systems through [facility]. Assessment:   1. Systolic CHF, chronic ([address]    2. Essential hypertension    3. Pure hypercholesterolemia    4. Vitamin D insufficiency        Impedance monitoring via Medtronic Optivol Cardiac Compass was downloaded from patient's ICD and reviewed. The impedance shows stable fluid level. Plan:  Continue current medications  Follow up with me in 1 year      This note was scribed in the presence of William Elena MD by Maria Elena Uribe RN    The scribe's documentation has been prepared under my direction and personally reviewed by me in its entirety. I confirm that the note above accurately reflects all work, treatment, procedures, and medical decision making performed by me. Time Based Itemization  A total of 30 minutes was spent on today's patient encounter.   If applicable, non-patient-facing activities:  ( x)Preparing to see the patient and reviewing records  ( ) Individual interpretation of results  ( ) Discussion or coordination of care with other health care professionals  ( x) Ordering of unique tests, medications, or procedures  ( x) Documentation within the EHR

## 2023-02-07 ENCOUNTER — NURSE ONLY (OUTPATIENT)
Dept: CARDIOLOGY CLINIC | Age: 63
End: 2023-02-07
Payer: MEDICARE

## 2023-02-07 ENCOUNTER — OFFICE VISIT (OUTPATIENT)
Dept: CARDIOLOGY CLINIC | Age: 63
End: 2023-02-07

## 2023-02-07 VITALS
SYSTOLIC BLOOD PRESSURE: 150 MMHG | WEIGHT: 213 LBS | HEART RATE: 79 BPM | BODY MASS INDEX: 36.37 KG/M2 | HEIGHT: 64 IN | OXYGEN SATURATION: 96 % | DIASTOLIC BLOOD PRESSURE: 70 MMHG

## 2023-02-07 DIAGNOSIS — Z95.810 ICD (IMPLANTABLE CARDIOVERTER-DEFIBRILLATOR) IN PLACE: ICD-10-CM

## 2023-02-07 DIAGNOSIS — Z45.02 ICD (IMPLANTABLE CARDIOVERTER-DEFIBRILLATOR) BATTERY DEPLETION: ICD-10-CM

## 2023-02-07 DIAGNOSIS — I42.8 NON-ISCHEMIC CARDIOMYOPATHY (HCC): ICD-10-CM

## 2023-02-07 DIAGNOSIS — I10 ESSENTIAL HYPERTENSION: ICD-10-CM

## 2023-02-07 DIAGNOSIS — I50.22 SYSTOLIC CHF, CHRONIC (HCC): Primary | ICD-10-CM

## 2023-02-07 DIAGNOSIS — E78.00 PURE HYPERCHOLESTEROLEMIA: ICD-10-CM

## 2023-02-07 DIAGNOSIS — E55.9 VITAMIN D INSUFFICIENCY: ICD-10-CM

## 2023-02-07 PROCEDURE — 93290 INTERROG DEV EVAL ICPMS IP: CPT | Performed by: INTERNAL MEDICINE

## 2023-02-07 PROCEDURE — 93284 PRGRMG EVAL IMPLANTABLE DFB: CPT | Performed by: INTERNAL MEDICINE

## 2023-02-07 RX ORDER — VALSARTAN 320 MG/1
320 TABLET ORAL DAILY
Qty: 90 TABLET | Refills: 3 | Status: SHIPPED | OUTPATIENT
Start: 2023-02-07

## 2023-02-07 RX ORDER — CARVEDILOL 25 MG/1
25 TABLET ORAL 2 TIMES DAILY WITH MEALS
Qty: 180 TABLET | Refills: 3 | Status: SHIPPED | OUTPATIENT
Start: 2023-02-07

## 2023-02-07 NOTE — PROGRESS NOTES
Patient presents to the device clinic today for a programming evaluation for her defibrillator. Patient has a history of NICM. Takes Coreg. Last device interrogation was on 10/10. Since then, no arrhythmias recorded. TI slightly above baseline. P wave: 4.0 mV  R wave: >20 mV    AP <0.1%  97.8% Effective 97.8%    All sensing and pacing parameters are within normal range. No changes need to be made at this time. Patient will see Dr. Jacoby Thornton today in office. Patient education was provided about device functionality, in home monitoring, and any other patient questions and/or concerns were addressed. Patient voices understanding. Patient will follow up in 3 months in office or remotely. Please see interrogation for more detail - Paceart report located under the Cardiology tab.

## 2023-03-16 ENCOUNTER — NURSE ONLY (OUTPATIENT)
Dept: CARDIOLOGY CLINIC | Age: 63
End: 2023-03-16

## 2023-03-16 DIAGNOSIS — I42.8 NON-ISCHEMIC CARDIOMYOPATHY (HCC): ICD-10-CM

## 2023-03-16 DIAGNOSIS — I50.22 SYSTOLIC CHF, CHRONIC (HCC): ICD-10-CM

## 2023-03-16 DIAGNOSIS — I44.7 LBBB (LEFT BUNDLE BRANCH BLOCK): ICD-10-CM

## 2023-03-16 DIAGNOSIS — Z95.810 PRESENCE OF CARDIAC RESYNCHRONIZATION THERAPY DEFIBRILLATOR (CRT-D): Primary | ICD-10-CM

## 2023-03-17 NOTE — PROGRESS NOTES
Remote transmission received from patient's CRT-D monitor at home. Transmission shows normal sensing and pacing function. No new arrhythmias/events recorded. EP physician will review. Ap <0.1%  BiVp 98.1%, Effective 98.1%, VSRp 1.3%  PVCs 0.2/hr    Optivol is within normal range, currently elevated slightly below 60 threshold w plateau noted; TI is back to reference line. TriageHF Heart Failure Risk Status on 16-Mar-2023 is Medium*. NP will review. End of 91-day monitoring period 3/16/23. See interrogation under cardiology tab in the [address] [place] [address] [address] field for more details. Will continue to monitor remotely.

## 2023-04-27 ENCOUNTER — NURSE ONLY (OUTPATIENT)
Dept: CARDIOLOGY CLINIC | Age: 63
End: 2023-04-27

## 2023-04-27 DIAGNOSIS — I42.8 NON-ISCHEMIC CARDIOMYOPATHY (HCC): ICD-10-CM

## 2023-04-27 DIAGNOSIS — I50.22 SYSTOLIC CHF, CHRONIC (HCC): ICD-10-CM

## 2023-04-27 DIAGNOSIS — I44.7 LBBB (LEFT BUNDLE BRANCH BLOCK): ICD-10-CM

## 2023-04-27 DIAGNOSIS — Z95.810 PRESENCE OF CARDIAC RESYNCHRONIZATION THERAPY DEFIBRILLATOR (CRT-D): Primary | ICD-10-CM

## 2023-04-27 NOTE — PROGRESS NOTES
Remote transmission received from patient's CRT-D monitor at home. Transmission shows normal sensing and pacing function. No new arrhythmias/events recorded. Ap <0.1%  BiVp 98.4%, Effective 98.4%, VSRp 1.3%  PVCs <0.1/hr    Optivol is within normal range, currently elevated slightly below 60 threshold w plateau/possible slight decreasing trend noted; TI is back to reference line. TriageHF Heart Failure Risk Status on 27-Apr-2023 is Medium*. End of 31-day monitoring period 4/27/23. MATEO will review. See interrogation under cardiology tab in the [address] [place] [address] [address] field for more details. Will continue to monitor remotely.

## 2023-05-25 ENCOUNTER — TELEPHONE (OUTPATIENT)
Dept: CARDIOLOGY CLINIC | Age: 63
End: 2023-05-25

## 2023-05-25 NOTE — TELEPHONE ENCOUNTER
Please call patient and ask to schedule EP NP and device clinic f/u per NOAH OV 8/17/2022. Plan:  1. Continue remote device checks every 3 months  2. Continue current cardiac medications   3.  Follow up with the EPNP in 6 months and JMB in 1 year

## 2023-06-06 ENCOUNTER — TELEPHONE (OUTPATIENT)
Dept: CARDIOLOGY CLINIC | Age: 63
End: 2023-06-06

## 2023-06-06 NOTE — TELEPHONE ENCOUNTER
CARDIAC CLEARANCE     What type of procedure are you having? Left total Knee  Which physician is performing your procedure? Dr Santino Callejas     When is your procedure scheduled for?  6/9/23  Where are you having this procedure? [facility]  Are you taking Blood Thinners? NO   If so what? (Name/dose/frequesncy)     Does the surgeon want you to stop your blood thinner? If so for how long?     Phone Number and Contact Name for Physicians office: [phone]    Fax number to send information:   [phone]   Also requesting last office note

## 2023-06-26 DIAGNOSIS — E55.9 VITAMIN D INSUFFICIENCY: ICD-10-CM

## 2023-06-26 DIAGNOSIS — I50.22 SYSTOLIC CHF, CHRONIC (HCC): ICD-10-CM

## 2023-06-26 DIAGNOSIS — R06.02 SOB (SHORTNESS OF BREATH): ICD-10-CM

## 2023-06-26 DIAGNOSIS — E78.00 PURE HYPERCHOLESTEROLEMIA: ICD-10-CM

## 2023-06-26 DIAGNOSIS — I10 ESSENTIAL HYPERTENSION: ICD-10-CM

## 2023-06-29 RX ORDER — ATORVASTATIN CALCIUM 20 MG/1
20 TABLET, FILM COATED ORAL DAILY
Qty: 90 TABLET | Refills: 1 | Status: SHIPPED | OUTPATIENT
Start: 2023-06-29

## 2023-07-12 ENCOUNTER — APPOINTMENT (OUTPATIENT)
Dept: CT IMAGING | Age: 63
DRG: 176 | End: 2023-07-12
Payer: MEDICARE

## 2023-07-12 ENCOUNTER — HOSPITAL ENCOUNTER (INPATIENT)
Age: 63
LOS: 2 days | Discharge: HOME | DRG: 176 | End: 2023-07-14
Attending: EMERGENCY MEDICINE | Admitting: ANESTHESIOLOGY
Payer: MEDICARE

## 2023-07-12 DIAGNOSIS — R07.9 CHEST PAIN, UNSPECIFIED TYPE: ICD-10-CM

## 2023-07-12 DIAGNOSIS — K63.9 CECAL LESION: ICD-10-CM

## 2023-07-12 DIAGNOSIS — E83.42 HYPOMAGNESEMIA: ICD-10-CM

## 2023-07-12 DIAGNOSIS — R10.9 FLANK PAIN: ICD-10-CM

## 2023-07-12 DIAGNOSIS — E87.6 HYPOKALEMIA: ICD-10-CM

## 2023-07-12 DIAGNOSIS — I26.99 OTHER ACUTE PULMONARY EMBOLISM, UNSPECIFIED WHETHER ACUTE COR PULMONALE PRESENT (HCC): Primary | ICD-10-CM

## 2023-07-12 DIAGNOSIS — R00.0 TACHYCARDIA: ICD-10-CM

## 2023-07-12 LAB
ALBUMIN SERPL-MCNC: 3.4 G/DL (ref 3.4–5)
ALBUMIN/GLOB SERPL: 0.7 {RATIO} (ref 1.1–2.2)
ALP SERPL-CCNC: 125 U/L (ref 40–129)
ALT SERPL-CCNC: 40 U/L (ref 10–40)
ANION GAP SERPL CALCULATED.3IONS-SCNC: 16 MMOL/L (ref 3–16)
ANTI-XA UNFRAC HEPARIN: <0.1 IU/ML (ref 0.3–0.7)
APTT BLD: 133.6 SEC (ref 22.7–35.9)
APTT BLD: 39.7 SEC (ref 22.7–35.9)
APTT BLD: >180 SEC (ref 22.7–35.9)
AST SERPL-CCNC: 23 U/L (ref 15–37)
BASE EXCESS BLDV CALC-SCNC: 1.1 MMOL/L (ref -3–3)
BASOPHILS # BLD: 0.1 K/UL (ref 0–0.2)
BASOPHILS NFR BLD: 0.5 %
BILIRUB SERPL-MCNC: 0.4 MG/DL (ref 0–1)
BILIRUB UR QL STRIP.AUTO: NEGATIVE
BUN SERPL-MCNC: 5 MG/DL (ref 7–20)
CALCIUM SERPL-MCNC: 9.8 MG/DL (ref 8.3–10.6)
CHLORIDE SERPL-SCNC: 95 MMOL/L (ref 99–110)
CLARITY UR: CLEAR
CO2 BLDV-SCNC: 27 MMOL/L
CO2 SERPL-SCNC: 24 MMOL/L (ref 21–32)
COHGB MFR BLDV: 3.4 % (ref 0–1.5)
COLOR UR: YELLOW
CREAT SERPL-MCNC: 0.9 MG/DL (ref 0.6–1.2)
DEPRECATED RDW RBC AUTO: 13.8 % (ref 12.4–15.4)
EKG ATRIAL RATE: 107 BPM
EKG DIAGNOSIS: NORMAL
EKG P AXIS: 54 DEGREES
EKG P-R INTERVAL: 142 MS
EKG Q-T INTERVAL: 390 MS
EKG QRS DURATION: 164 MS
EKG QTC CALCULATION (BAZETT): 520 MS
EKG R AXIS: 53 DEGREES
EKG T AXIS: 90 DEGREES
EKG VENTRICULAR RATE: 107 BPM
EOSINOPHIL # BLD: 0.1 K/UL (ref 0–0.6)
EOSINOPHIL NFR BLD: 0.5 %
EPI CELLS #/AREA URNS HPF: NORMAL /HPF (ref 0–5)
FIBRINOGEN PPP-MCNC: >1000 MG/DL (ref 243–550)
GFR SERPLBLD CREATININE-BSD FMLA CKD-EPI: >60 ML/MIN/{1.73_M2}
GLUCOSE BLD-MCNC: 110 MG/DL (ref 70–99)
GLUCOSE BLD-MCNC: 112 MG/DL (ref 70–99)
GLUCOSE BLD-MCNC: NORMAL MG/DL
GLUCOSE SERPL-MCNC: 109 MG/DL (ref 70–99)
GLUCOSE UR STRIP.AUTO-MCNC: NEGATIVE MG/DL
HCO3 BLDV-SCNC: 25.5 MMOL/L (ref 23–29)
HCT VFR BLD AUTO: 30.2 % (ref 36–48)
HCT VFR BLD AUTO: 30.2 % (ref 36–48)
HCT VFR BLD AUTO: 34.6 % (ref 36–48)
HEMOCCULT SP1 STL QL: ABNORMAL
HGB BLD-MCNC: 10.1 G/DL (ref 12–16)
HGB BLD-MCNC: 10.2 G/DL (ref 12–16)
HGB BLD-MCNC: 11.8 G/DL (ref 12–16)
HGB UR QL STRIP.AUTO: ABNORMAL
INR PPP: 1.35 (ref 0.84–1.16)
KETONES UR STRIP.AUTO-MCNC: NEGATIVE MG/DL
LACTATE BLDV-SCNC: 1.5 MMOL/L (ref 0.4–1.9)
LEUKOCYTE ESTERASE UR QL STRIP.AUTO: NEGATIVE
LIPASE SERPL-CCNC: 33 U/L (ref 13–60)
LYMPHOCYTES # BLD: 2.8 K/UL (ref 1–5.1)
LYMPHOCYTES NFR BLD: 17.8 %
MAGNESIUM SERPL-MCNC: 1.7 MG/DL (ref 1.8–2.4)
MCH RBC QN AUTO: 31.7 PG (ref 26–34)
MCHC RBC AUTO-ENTMCNC: 34.1 G/DL (ref 31–36)
MCV RBC AUTO: 93 FL (ref 80–100)
METHGB MFR BLDV: 0.4 %
MONOCYTES # BLD: 1.5 K/UL (ref 0–1.3)
MONOCYTES NFR BLD: 9.4 %
NEUTROPHILS # BLD: 11.5 K/UL (ref 1.7–7.7)
NEUTROPHILS NFR BLD: 71.8 %
NITRITE UR QL STRIP.AUTO: NEGATIVE
NT-PROBNP SERPL-MCNC: 362 PG/ML (ref 0–124)
O2 THERAPY: ABNORMAL
PCO2 BLDV: 39.6 MMHG (ref 40–50)
PERFORMED ON: ABNORMAL
PERFORMED ON: ABNORMAL
PH BLDV: 7.43 [PH] (ref 7.35–7.45)
PH UR STRIP.AUTO: 7 [PH] (ref 5–8)
PLATELET # BLD AUTO: 478 K/UL (ref 135–450)
PMV BLD AUTO: 7.5 FL (ref 5–10.5)
PO2 BLDV: 41.5 MMHG (ref 25–40)
POTASSIUM SERPL-SCNC: 3.4 MMOL/L (ref 3.5–5.1)
PROCALCITONIN SERPL IA-MCNC: 0.32 NG/ML (ref 0–0.15)
PROT SERPL-MCNC: 8 G/DL (ref 6.4–8.2)
PROT UR STRIP.AUTO-MCNC: NEGATIVE MG/DL
PROTHROMBIN TIME: 16.7 SEC (ref 11.5–14.8)
RBC # BLD AUTO: 3.72 M/UL (ref 4–5.2)
RBC #/AREA URNS HPF: NORMAL /HPF (ref 0–4)
SAO2 % BLDV: 76 %
SARS-COV-2 RDRP RESP QL NAA+PROBE: NOT DETECTED
SODIUM SERPL-SCNC: 135 MMOL/L (ref 136–145)
SP GR UR STRIP.AUTO: <=1.005 (ref 1–1.03)
TROPONIN, HIGH SENSITIVITY: 12 NG/L (ref 0–14)
TROPONIN, HIGH SENSITIVITY: 12 NG/L (ref 0–14)
TROPONIN, HIGH SENSITIVITY: 22 NG/L (ref 0–14)
UA COMPLETE W REFLEX CULTURE PNL UR: ABNORMAL
UA DIPSTICK W REFLEX MICRO PNL UR: YES
URN SPEC COLLECT METH UR: ABNORMAL
UROBILINOGEN UR STRIP-ACNC: 0.2 E.U./DL
WBC # BLD AUTO: 16 K/UL (ref 4–11)
WBC #/AREA URNS HPF: NORMAL /HPF (ref 0–5)

## 2023-07-12 PROCEDURE — C9113 INJ PANTOPRAZOLE SODIUM, VIA: HCPCS | Performed by: ANESTHESIOLOGY

## 2023-07-12 PROCEDURE — 84145 PROCALCITONIN (PCT): CPT

## 2023-07-12 PROCEDURE — 96368 THER/DIAG CONCURRENT INF: CPT

## 2023-07-12 PROCEDURE — 2060000000 HC ICU INTERMEDIATE R&B

## 2023-07-12 PROCEDURE — 87635 SARS-COV-2 COVID-19 AMP PRB: CPT

## 2023-07-12 PROCEDURE — 2580000003 HC RX 258: Performed by: EMERGENCY MEDICINE

## 2023-07-12 PROCEDURE — 6360000002 HC RX W HCPCS: Performed by: EMERGENCY MEDICINE

## 2023-07-12 PROCEDURE — 87040 BLOOD CULTURE FOR BACTERIA: CPT

## 2023-07-12 PROCEDURE — 83690 ASSAY OF LIPASE: CPT

## 2023-07-12 PROCEDURE — 96367 TX/PROPH/DG ADDL SEQ IV INF: CPT

## 2023-07-12 PROCEDURE — 93005 ELECTROCARDIOGRAM TRACING: CPT | Performed by: EMERGENCY MEDICINE

## 2023-07-12 PROCEDURE — 85384 FIBRINOGEN ACTIVITY: CPT

## 2023-07-12 PROCEDURE — G0378 HOSPITAL OBSERVATION PER HR: HCPCS

## 2023-07-12 PROCEDURE — 6370000000 HC RX 637 (ALT 250 FOR IP): Performed by: ANESTHESIOLOGY

## 2023-07-12 PROCEDURE — 2580000003 HC RX 258: Performed by: ANESTHESIOLOGY

## 2023-07-12 PROCEDURE — 96375 TX/PRO/DX INJ NEW DRUG ADDON: CPT

## 2023-07-12 PROCEDURE — 96365 THER/PROPH/DIAG IV INF INIT: CPT

## 2023-07-12 PROCEDURE — 93010 ELECTROCARDIOGRAM REPORT: CPT | Performed by: INTERNAL MEDICINE

## 2023-07-12 PROCEDURE — 85025 COMPLETE CBC W/AUTO DIFF WBC: CPT

## 2023-07-12 PROCEDURE — 6360000002 HC RX W HCPCS: Performed by: ANESTHESIOLOGY

## 2023-07-12 PROCEDURE — 83605 ASSAY OF LACTIC ACID: CPT

## 2023-07-12 PROCEDURE — 96376 TX/PRO/DX INJ SAME DRUG ADON: CPT

## 2023-07-12 PROCEDURE — 82270 OCCULT BLOOD FECES: CPT

## 2023-07-12 PROCEDURE — 84484 ASSAY OF TROPONIN QUANT: CPT

## 2023-07-12 PROCEDURE — 82803 BLOOD GASES ANY COMBINATION: CPT

## 2023-07-12 PROCEDURE — 85014 HEMATOCRIT: CPT

## 2023-07-12 PROCEDURE — 74177 CT ABD & PELVIS W/CONTRAST: CPT

## 2023-07-12 PROCEDURE — 83880 ASSAY OF NATRIURETIC PEPTIDE: CPT

## 2023-07-12 PROCEDURE — 71260 CT THORAX DX C+: CPT

## 2023-07-12 PROCEDURE — 85730 THROMBOPLASTIN TIME PARTIAL: CPT

## 2023-07-12 PROCEDURE — 6360000004 HC RX CONTRAST MEDICATION: Performed by: EMERGENCY MEDICINE

## 2023-07-12 PROCEDURE — 80053 COMPREHEN METABOLIC PANEL: CPT

## 2023-07-12 PROCEDURE — 85610 PROTHROMBIN TIME: CPT

## 2023-07-12 PROCEDURE — 81001 URINALYSIS AUTO W/SCOPE: CPT

## 2023-07-12 PROCEDURE — 99285 EMERGENCY DEPT VISIT HI MDM: CPT

## 2023-07-12 PROCEDURE — 85018 HEMOGLOBIN: CPT

## 2023-07-12 PROCEDURE — 96366 THER/PROPH/DIAG IV INF ADDON: CPT

## 2023-07-12 PROCEDURE — 6370000000 HC RX 637 (ALT 250 FOR IP): Performed by: NURSE PRACTITIONER

## 2023-07-12 PROCEDURE — 83735 ASSAY OF MAGNESIUM: CPT

## 2023-07-12 PROCEDURE — C9113 INJ PANTOPRAZOLE SODIUM, VIA: HCPCS | Performed by: EMERGENCY MEDICINE

## 2023-07-12 PROCEDURE — 96361 HYDRATE IV INFUSION ADD-ON: CPT

## 2023-07-12 PROCEDURE — 6370000000 HC RX 637 (ALT 250 FOR IP): Performed by: EMERGENCY MEDICINE

## 2023-07-12 PROCEDURE — 85520 HEPARIN ASSAY: CPT

## 2023-07-12 PROCEDURE — 2500000003 HC RX 250 WO HCPCS: Performed by: EMERGENCY MEDICINE

## 2023-07-12 RX ORDER — PANTOPRAZOLE SODIUM 40 MG/10ML
80 INJECTION, POWDER, LYOPHILIZED, FOR SOLUTION INTRAVENOUS ONCE
Status: COMPLETED | OUTPATIENT
Start: 2023-07-12 | End: 2023-07-12

## 2023-07-12 RX ORDER — AMLODIPINE BESYLATE 5 MG/1
5 TABLET ORAL 2 TIMES DAILY
Status: DISCONTINUED | OUTPATIENT
Start: 2023-07-12 | End: 2023-07-14 | Stop reason: HOSPADM

## 2023-07-12 RX ORDER — ONDANSETRON 2 MG/ML
4 INJECTION INTRAMUSCULAR; INTRAVENOUS EVERY 6 HOURS PRN
Status: DISCONTINUED | OUTPATIENT
Start: 2023-07-12 | End: 2023-07-13

## 2023-07-12 RX ORDER — INSULIN LISPRO 100 [IU]/ML
0-4 INJECTION, SOLUTION INTRAVENOUS; SUBCUTANEOUS
Status: DISCONTINUED | OUTPATIENT
Start: 2023-07-12 | End: 2023-07-14 | Stop reason: HOSPADM

## 2023-07-12 RX ORDER — ATORVASTATIN CALCIUM 10 MG/1
20 TABLET, FILM COATED ORAL DAILY
Status: DISCONTINUED | OUTPATIENT
Start: 2023-07-12 | End: 2023-07-14 | Stop reason: HOSPADM

## 2023-07-12 RX ORDER — HEPARIN SODIUM 1000 [USP'U]/ML
2800 INJECTION, SOLUTION INTRAVENOUS; SUBCUTANEOUS ONCE
Status: COMPLETED | OUTPATIENT
Start: 2023-07-12 | End: 2023-07-12

## 2023-07-12 RX ORDER — HEPARIN SODIUM 1000 [USP'U]/ML
5500 INJECTION, SOLUTION INTRAVENOUS; SUBCUTANEOUS ONCE
Status: COMPLETED | OUTPATIENT
Start: 2023-07-12 | End: 2023-07-12

## 2023-07-12 RX ORDER — ACETAMINOPHEN 650 MG/1
650 SUPPOSITORY RECTAL EVERY 6 HOURS PRN
Status: DISCONTINUED | OUTPATIENT
Start: 2023-07-12 | End: 2023-07-14 | Stop reason: HOSPADM

## 2023-07-12 RX ORDER — SODIUM CHLORIDE 0.9 % (FLUSH) 0.9 %
5-40 SYRINGE (ML) INJECTION PRN
Status: DISCONTINUED | OUTPATIENT
Start: 2023-07-12 | End: 2023-07-14 | Stop reason: HOSPADM

## 2023-07-12 RX ORDER — HEPARIN SODIUM 1000 [USP'U]/ML
5500 INJECTION, SOLUTION INTRAVENOUS; SUBCUTANEOUS PRN
Status: DISCONTINUED | OUTPATIENT
Start: 2023-07-12 | End: 2023-07-14

## 2023-07-12 RX ORDER — POTASSIUM CHLORIDE 750 MG/1
10 TABLET, EXTENDED RELEASE ORAL ONCE
Status: COMPLETED | OUTPATIENT
Start: 2023-07-12 | End: 2023-07-12

## 2023-07-12 RX ORDER — INSULIN LISPRO 100 [IU]/ML
0-4 INJECTION, SOLUTION INTRAVENOUS; SUBCUTANEOUS NIGHTLY
Status: DISCONTINUED | OUTPATIENT
Start: 2023-07-12 | End: 2023-07-14 | Stop reason: HOSPADM

## 2023-07-12 RX ORDER — HEPARIN SODIUM 1000 [USP'U]/ML
2800 INJECTION, SOLUTION INTRAVENOUS; SUBCUTANEOUS PRN
Status: DISCONTINUED | OUTPATIENT
Start: 2023-07-12 | End: 2023-07-14

## 2023-07-12 RX ORDER — 0.9 % SODIUM CHLORIDE 0.9 %
1000 INTRAVENOUS SOLUTION INTRAVENOUS ONCE
Status: COMPLETED | OUTPATIENT
Start: 2023-07-12 | End: 2023-07-12

## 2023-07-12 RX ORDER — PANTOPRAZOLE SODIUM 40 MG/10ML
40 INJECTION, POWDER, LYOPHILIZED, FOR SOLUTION INTRAVENOUS 2 TIMES DAILY
Status: DISCONTINUED | OUTPATIENT
Start: 2023-07-12 | End: 2023-07-14 | Stop reason: HOSPADM

## 2023-07-12 RX ORDER — CARVEDILOL 25 MG/1
25 TABLET ORAL 2 TIMES DAILY WITH MEALS
Status: DISCONTINUED | OUTPATIENT
Start: 2023-07-12 | End: 2023-07-14 | Stop reason: HOSPADM

## 2023-07-12 RX ORDER — ACETAMINOPHEN 325 MG/1
650 TABLET ORAL EVERY 6 HOURS PRN
Status: DISCONTINUED | OUTPATIENT
Start: 2023-07-12 | End: 2023-07-14 | Stop reason: HOSPADM

## 2023-07-12 RX ORDER — MORPHINE SULFATE 4 MG/ML
4 INJECTION, SOLUTION INTRAMUSCULAR; INTRAVENOUS
Status: COMPLETED | OUTPATIENT
Start: 2023-07-12 | End: 2023-07-12

## 2023-07-12 RX ORDER — SODIUM CHLORIDE 9 MG/ML
INJECTION, SOLUTION INTRAVENOUS PRN
Status: DISCONTINUED | OUTPATIENT
Start: 2023-07-12 | End: 2023-07-14 | Stop reason: HOSPADM

## 2023-07-12 RX ORDER — POLYETHYLENE GLYCOL 3350 17 G/17G
17 POWDER, FOR SOLUTION ORAL DAILY PRN
Status: DISCONTINUED | OUTPATIENT
Start: 2023-07-12 | End: 2023-07-14 | Stop reason: HOSPADM

## 2023-07-12 RX ORDER — LANOLIN ALCOHOL/MO/W.PET/CERES
400 CREAM (GRAM) TOPICAL DAILY
Status: DISCONTINUED | OUTPATIENT
Start: 2023-07-12 | End: 2023-07-14 | Stop reason: HOSPADM

## 2023-07-12 RX ORDER — LANOLIN ALCOHOL/MO/W.PET/CERES
10 CREAM (GRAM) TOPICAL DAILY
COMMUNITY

## 2023-07-12 RX ORDER — SODIUM CHLORIDE 9 MG/ML
INJECTION, SOLUTION INTRAVENOUS CONTINUOUS
Status: DISCONTINUED | OUTPATIENT
Start: 2023-07-12 | End: 2023-07-13

## 2023-07-12 RX ORDER — ONDANSETRON 4 MG/1
4 TABLET, ORALLY DISINTEGRATING ORAL EVERY 8 HOURS PRN
Status: DISCONTINUED | OUTPATIENT
Start: 2023-07-12 | End: 2023-07-13

## 2023-07-12 RX ORDER — MECOBALAMIN 5000 MCG
10 TABLET,DISINTEGRATING ORAL NIGHTLY PRN
Status: DISCONTINUED | OUTPATIENT
Start: 2023-07-12 | End: 2023-07-14 | Stop reason: HOSPADM

## 2023-07-12 RX ORDER — MAGNESIUM SULFATE IN WATER 40 MG/ML
2000 INJECTION, SOLUTION INTRAVENOUS ONCE
Status: COMPLETED | OUTPATIENT
Start: 2023-07-12 | End: 2023-07-12

## 2023-07-12 RX ORDER — VALSARTAN 80 MG/1
320 TABLET ORAL DAILY
Status: DISCONTINUED | OUTPATIENT
Start: 2023-07-12 | End: 2023-07-14 | Stop reason: HOSPADM

## 2023-07-12 RX ORDER — POTASSIUM CHLORIDE 20 MEQ/1
40 TABLET, EXTENDED RELEASE ORAL ONCE
Status: COMPLETED | OUTPATIENT
Start: 2023-07-12 | End: 2023-07-12

## 2023-07-12 RX ORDER — FAMOTIDINE 10 MG/ML
20 INJECTION, SOLUTION INTRAVENOUS ONCE
Status: COMPLETED | OUTPATIENT
Start: 2023-07-12 | End: 2023-07-12

## 2023-07-12 RX ORDER — HEPARIN SODIUM 10000 [USP'U]/100ML
1660 INJECTION, SOLUTION INTRAVENOUS CONTINUOUS
Status: DISCONTINUED | OUTPATIENT
Start: 2023-07-12 | End: 2023-07-14

## 2023-07-12 RX ORDER — SODIUM CHLORIDE 0.9 % (FLUSH) 0.9 %
5-40 SYRINGE (ML) INJECTION EVERY 12 HOURS SCHEDULED
Status: DISCONTINUED | OUTPATIENT
Start: 2023-07-12 | End: 2023-07-14 | Stop reason: HOSPADM

## 2023-07-12 RX ORDER — PANTOPRAZOLE SODIUM 40 MG/1
40 TABLET, DELAYED RELEASE ORAL
Status: DISCONTINUED | OUTPATIENT
Start: 2023-07-12 | End: 2023-07-12

## 2023-07-12 RX ADMIN — HEPARIN SODIUM 2800 UNITS: 1000 INJECTION INTRAVENOUS; SUBCUTANEOUS at 22:39

## 2023-07-12 RX ADMIN — MAGNESIUM SULFATE HEPTAHYDRATE 2000 MG: 40 INJECTION, SOLUTION INTRAVENOUS at 16:49

## 2023-07-12 RX ADMIN — IOPAMIDOL 75 ML: 755 INJECTION, SOLUTION INTRAVENOUS at 14:08

## 2023-07-12 RX ADMIN — VALSARTAN 320 MG: 80 TABLET, FILM COATED ORAL at 19:38

## 2023-07-12 RX ADMIN — HEPARIN SODIUM 1240 UNITS/HR: 10000 INJECTION, SOLUTION INTRAVENOUS at 22:41

## 2023-07-12 RX ADMIN — POTASSIUM CHLORIDE 40 MEQ: 1500 TABLET, EXTENDED RELEASE ORAL at 16:45

## 2023-07-12 RX ADMIN — CARVEDILOL 25 MG: 25 TABLET, FILM COATED ORAL at 19:38

## 2023-07-12 RX ADMIN — HEPARIN SODIUM 1240 UNITS/HR: 10000 INJECTION, SOLUTION INTRAVENOUS at 15:28

## 2023-07-12 RX ADMIN — Medication 10 MG: at 23:57

## 2023-07-12 RX ADMIN — FAMOTIDINE 20 MG: 10 INJECTION, SOLUTION INTRAVENOUS at 13:26

## 2023-07-12 RX ADMIN — CEFEPIME 2000 MG: 2 INJECTION, POWDER, FOR SOLUTION INTRAVENOUS at 16:11

## 2023-07-12 RX ADMIN — POTASSIUM CHLORIDE 10 MEQ: 750 TABLET, EXTENDED RELEASE ORAL at 14:25

## 2023-07-12 RX ADMIN — PANTOPRAZOLE SODIUM 80 MG: 40 INJECTION, POWDER, FOR SOLUTION INTRAVENOUS at 13:26

## 2023-07-12 RX ADMIN — SODIUM CHLORIDE: 9 INJECTION, SOLUTION INTRAVENOUS at 19:43

## 2023-07-12 RX ADMIN — ATORVASTATIN CALCIUM 20 MG: 10 TABLET, FILM COATED ORAL at 19:38

## 2023-07-12 RX ADMIN — ACETAMINOPHEN 650 MG: 325 TABLET ORAL at 22:47

## 2023-07-12 RX ADMIN — AMLODIPINE BESYLATE 5 MG: 5 TABLET ORAL at 19:38

## 2023-07-12 RX ADMIN — SODIUM CHLORIDE 1000 ML: 9 INJECTION, SOLUTION INTRAVENOUS at 13:25

## 2023-07-12 RX ADMIN — HEPARIN SODIUM 5500 UNITS: 1000 INJECTION INTRAVENOUS; SUBCUTANEOUS at 15:25

## 2023-07-12 RX ADMIN — MORPHINE SULFATE 4 MG: 4 INJECTION, SOLUTION INTRAMUSCULAR; INTRAVENOUS at 13:26

## 2023-07-12 RX ADMIN — PANTOPRAZOLE SODIUM 40 MG: 40 INJECTION, POWDER, FOR SOLUTION INTRAVENOUS at 21:00

## 2023-07-12 RX ADMIN — Medication 400 MG: at 14:25

## 2023-07-12 ASSESSMENT — ENCOUNTER SYMPTOMS
DIARRHEA: 0
BACK PAIN: 0
ABDOMINAL PAIN: 1
VOMITING: 0
CHEST TIGHTNESS: 1
COUGH: 1
SHORTNESS OF BREATH: 1
RHINORRHEA: 0

## 2023-07-12 ASSESSMENT — PAIN SCALES - GENERAL
PAINLEVEL_OUTOF10: 8
PAINLEVEL_OUTOF10: 0
PAINLEVEL_OUTOF10: 6
PAINLEVEL_OUTOF10: 6
PAINLEVEL_OUTOF10: 8

## 2023-07-12 ASSESSMENT — PAIN - FUNCTIONAL ASSESSMENT
PAIN_FUNCTIONAL_ASSESSMENT: ACTIVITIES ARE NOT PREVENTED
PAIN_FUNCTIONAL_ASSESSMENT: 0-10

## 2023-07-12 ASSESSMENT — PAIN DESCRIPTION - LOCATION: LOCATION: HEAD

## 2023-07-12 ASSESSMENT — PAIN DESCRIPTION - PAIN TYPE: TYPE: ACUTE PAIN

## 2023-07-12 NOTE — H&P
V2.0  History and Physical      Name:  Bentley Salinas /Age/Sex: 1960  (59 y.o. female)   MRN & CSN:  [id number] & [id number] Encounter Date/Time: 2023 4:04 PM EDT   Location:   PCP: Asim Adams MD       Hospital Day: 1    Assessment and Plan:   Acute PE: Continue with heparin drip, telemetry monitor, trend troponin, echo, vascular surgery and pulmonology consulted    Possible lower GIB: repeat Hb stable, PPI, continue to monitor H&H every 6 hr. Vascular surgery consulted who recommended to continue heparin drip and keep  PTT between 45 and 65. GI consulted. Leukocytosis: Could be reactive, patient completed about one week of oral antibiotics as outpatient for suspected pneumonia. CT chest negative for pneumonia. Will hold antibiotics at this time. Hypokalemia: replaced    Hypomagnesemia: replaced    Hypertension: Beta-blocker, valsartan and amlodipine    Thickened and distended cecum concerning for malignancy on CT: GI consulted    Admit: inpatient  Code: full  DVT proph: SCD, heparin      History of Present Illness:     Chief Complaint: Chest pain  Bentley Salinas is a 59 y.o. female who is presenting with left sided chest pain for few days. Patient reports fever. She was prescribed antibiotics as outpatient for pneumonia with amoxicillin and switched to Levaquin yesterday. She completed about one week of oral antibiotics. When I saw the patient he was comfortable in bed, awake alert oriented x3 on room air. Chest pain and SOB were resolved. Patient reports recent 12 hours car drive to [place]. She states that she had normal colonoscopy few months ago. Patient denies AP, cough, NV, dysuria, weight loss and hematuria. In the ED patient was afebrile, tachycardic heart rate 110, blood pressure was stable. Labs showed sodium 135, potassium 3.4, BUN 5, creatinine 0.9, blood sugar 109, proBNP 362, troponin 12>>22, white count 16, hemoglobin 11.8, platelets 125.   UA

## 2023-07-12 NOTE — ED PROVIDER NOTES
Emergency Department Attending Physician Note  Location: [facility]  ED  7/12/2023       Pt Name: Bunny Combs  MRN: [id number]  [address] [facility] 1960    Date of evaluation: 7/12/2023  Provider: Mita Gonzalez DO  PCP: Mayra Tang MD    Note Started: 1:00 PM EDT 7/12/23    CHIEF COMPLAINT  Chief Complaint   Patient presents with    Chest Pain     Pt to ED for c/o \"Deep, extreme, steep chest pain in my left lung. \" Pt states she was diagnosed with pneumonia in her left lung on Saturday. Pt states she is having an ongoing fever, and is currently taking several abx that arent working. Pt states her PCP is concerned about blood clots d/t recent travel. Shortness of Breath       HISTORY OF PRESENT ILLNESS:  History obtained by patient. Limitations to history : None. Bunny Combs is a 59 y.o. female with a significant PMHx of CHF, with a pacemaker, fibromyalgia, GERD, hypertension, and recent diagnosis of pneumonia, presenting emergency department today with concerns of left-sided chest pain and SOB, and arrives emergency department today tachycardic to 110. Fever earlier today as well, took some Tylenol, and arrives emergency department with a temperature of 98.4. Says she felt much better after she took Tylenol. Had been on amoxacillin for PNA, switched to Levaquin yesterday, but symptoms persist. She also has some epigastric abdominal pain as well is right upper quadrant abdominal pain, and neck pain. She is pointing to her left lower ribs, with pain, the same side of her chest pain and no pneumonia. Otherwise, says sitting here she feels okay. Denies any headaches, vision changes, numbness, weakness, tingling. Denies any dysuria or hematuria. Of note, patient recently did travel to [place], and PCP was concerned about pulmonary embolism. 4:35 PM EDT  On further history with patient regarding the thickened cecum; been having some rectal bleeding.     On chart

## 2023-07-12 NOTE — ED NOTES
Called Vascular Surgery @ 1522  Reg: Left main PE  Per: Dr. Venkat Munoz    Recalled Vascular Surgery @ [phone] d/t no callback    Recalled Vascular Surgery @ 1642 d/t no callback    Called surgery to attempt to speak to Dr. Magalys Jo who was in a case.  Dr. Magalys Jo was able to speak to Dr. Heron Pro @ [address] [facility],[address]  07/12/23 [id number]

## 2023-07-13 ENCOUNTER — APPOINTMENT (OUTPATIENT)
Dept: VASCULAR LAB | Age: 63
DRG: 176 | End: 2023-07-13
Payer: MEDICARE

## 2023-07-13 LAB
ANION GAP SERPL CALCULATED.3IONS-SCNC: 12 MMOL/L (ref 3–16)
ANTI-XA UNFRAC HEPARIN: 0.18 IU/ML (ref 0.3–0.7)
ANTI-XA UNFRAC HEPARIN: 0.22 IU/ML (ref 0.3–0.7)
ANTI-XA UNFRAC HEPARIN: 0.32 IU/ML (ref 0.3–0.7)
APTT BLD: 63 SEC (ref 22.7–35.9)
BASOPHILS # BLD: 0 K/UL (ref 0–0.2)
BASOPHILS NFR BLD: 0.3 %
BUN SERPL-MCNC: 6 MG/DL (ref 7–20)
CALCIUM SERPL-MCNC: 9.3 MG/DL (ref 8.3–10.6)
CHLORIDE SERPL-SCNC: 100 MMOL/L (ref 99–110)
CO2 SERPL-SCNC: 23 MMOL/L (ref 21–32)
CREAT SERPL-MCNC: 0.7 MG/DL (ref 0.6–1.2)
DEPRECATED RDW RBC AUTO: 13.7 % (ref 12.4–15.4)
EOSINOPHIL # BLD: 0.1 K/UL (ref 0–0.6)
EOSINOPHIL NFR BLD: 1 %
GFR SERPLBLD CREATININE-BSD FMLA CKD-EPI: >60 ML/MIN/{1.73_M2}
GLUCOSE BLD-MCNC: 102 MG/DL (ref 70–99)
GLUCOSE BLD-MCNC: 105 MG/DL (ref 70–99)
GLUCOSE BLD-MCNC: 132 MG/DL (ref 70–99)
GLUCOSE SERPL-MCNC: 105 MG/DL (ref 70–99)
HCT VFR BLD AUTO: 31.8 % (ref 36–48)
HEMOCCULT SP1 STL QL: NORMAL
HGB BLD-MCNC: 10.6 G/DL (ref 12–16)
LV EF: 58 %
LVEF MODALITY: NORMAL
LYMPHOCYTES # BLD: 2.5 K/UL (ref 1–5.1)
LYMPHOCYTES NFR BLD: 17.9 %
MCH RBC QN AUTO: 31.3 PG (ref 26–34)
MCHC RBC AUTO-ENTMCNC: 33.4 G/DL (ref 31–36)
MCV RBC AUTO: 93.6 FL (ref 80–100)
MONOCYTES # BLD: 1.2 K/UL (ref 0–1.3)
MONOCYTES NFR BLD: 8.9 %
NEUTROPHILS # BLD: 10.1 K/UL (ref 1.7–7.7)
NEUTROPHILS NFR BLD: 71.9 %
PERFORMED ON: ABNORMAL
PLATELET # BLD AUTO: 463 K/UL (ref 135–450)
PMV BLD AUTO: 7.6 FL (ref 5–10.5)
POTASSIUM SERPL-SCNC: 3.9 MMOL/L (ref 3.5–5.1)
RBC # BLD AUTO: 3.39 M/UL (ref 4–5.2)
SODIUM SERPL-SCNC: 135 MMOL/L (ref 136–145)
TROPONIN, HIGH SENSITIVITY: 11 NG/L (ref 0–14)
WBC # BLD AUTO: 14 K/UL (ref 4–11)

## 2023-07-13 PROCEDURE — 96376 TX/PRO/DX INJ SAME DRUG ADON: CPT

## 2023-07-13 PROCEDURE — 93306 TTE W/DOPPLER COMPLETE: CPT

## 2023-07-13 PROCEDURE — 6360000002 HC RX W HCPCS: Performed by: EMERGENCY MEDICINE

## 2023-07-13 PROCEDURE — 6360000002 HC RX W HCPCS: Performed by: ANESTHESIOLOGY

## 2023-07-13 PROCEDURE — 2580000003 HC RX 258: Performed by: EMERGENCY MEDICINE

## 2023-07-13 PROCEDURE — 96368 THER/DIAG CONCURRENT INF: CPT

## 2023-07-13 PROCEDURE — C9113 INJ PANTOPRAZOLE SODIUM, VIA: HCPCS | Performed by: ANESTHESIOLOGY

## 2023-07-13 PROCEDURE — 96366 THER/PROPH/DIAG IV INF ADDON: CPT

## 2023-07-13 PROCEDURE — 85730 THROMBOPLASTIN TIME PARTIAL: CPT

## 2023-07-13 PROCEDURE — 93970 EXTREMITY STUDY: CPT

## 2023-07-13 PROCEDURE — 94640 AIRWAY INHALATION TREATMENT: CPT

## 2023-07-13 PROCEDURE — 85025 COMPLETE CBC W/AUTO DIFF WBC: CPT

## 2023-07-13 PROCEDURE — 80048 BASIC METABOLIC PNL TOTAL CA: CPT

## 2023-07-13 PROCEDURE — 6370000000 HC RX 637 (ALT 250 FOR IP): Performed by: NURSE PRACTITIONER

## 2023-07-13 PROCEDURE — 96361 HYDRATE IV INFUSION ADD-ON: CPT

## 2023-07-13 PROCEDURE — 99223 1ST HOSP IP/OBS HIGH 75: CPT | Performed by: INTERNAL MEDICINE

## 2023-07-13 PROCEDURE — 6360000002 HC RX W HCPCS: Performed by: INTERNAL MEDICINE

## 2023-07-13 PROCEDURE — 82270 OCCULT BLOOD FECES: CPT

## 2023-07-13 PROCEDURE — 94761 N-INVAS EAR/PLS OXIMETRY MLT: CPT

## 2023-07-13 PROCEDURE — G0378 HOSPITAL OBSERVATION PER HR: HCPCS

## 2023-07-13 PROCEDURE — 6370000000 HC RX 637 (ALT 250 FOR IP): Performed by: EMERGENCY MEDICINE

## 2023-07-13 PROCEDURE — 85520 HEPARIN ASSAY: CPT

## 2023-07-13 PROCEDURE — 36415 COLL VENOUS BLD VENIPUNCTURE: CPT

## 2023-07-13 PROCEDURE — 2060000000 HC ICU INTERMEDIATE R&B

## 2023-07-13 PROCEDURE — 99222 1ST HOSP IP/OBS MODERATE 55: CPT | Performed by: SURGERY

## 2023-07-13 PROCEDURE — 84484 ASSAY OF TROPONIN QUANT: CPT

## 2023-07-13 PROCEDURE — 2580000003 HC RX 258: Performed by: ANESTHESIOLOGY

## 2023-07-13 PROCEDURE — 6370000000 HC RX 637 (ALT 250 FOR IP): Performed by: ANESTHESIOLOGY

## 2023-07-13 RX ORDER — HEPARIN SODIUM 1000 [USP'U]/ML
2800 INJECTION, SOLUTION INTRAVENOUS; SUBCUTANEOUS ONCE
Status: COMPLETED | OUTPATIENT
Start: 2023-07-13 | End: 2023-07-13

## 2023-07-13 RX ORDER — PROCHLORPERAZINE EDISYLATE 5 MG/ML
10 INJECTION INTRAMUSCULAR; INTRAVENOUS EVERY 6 HOURS PRN
Status: DISCONTINUED | OUTPATIENT
Start: 2023-07-13 | End: 2023-07-14 | Stop reason: HOSPADM

## 2023-07-13 RX ORDER — DEXTROSE MONOHYDRATE 100 MG/ML
INJECTION, SOLUTION INTRAVENOUS CONTINUOUS PRN
Status: DISCONTINUED | OUTPATIENT
Start: 2023-07-13 | End: 2023-07-14 | Stop reason: HOSPADM

## 2023-07-13 RX ADMIN — HEPARIN SODIUM 2800 UNITS: 1000 INJECTION INTRAVENOUS; SUBCUTANEOUS at 06:08

## 2023-07-13 RX ADMIN — CEFEPIME 2000 MG: 2 INJECTION, POWDER, FOR SOLUTION INTRAVENOUS at 04:54

## 2023-07-13 RX ADMIN — ATORVASTATIN CALCIUM 20 MG: 10 TABLET, FILM COATED ORAL at 09:33

## 2023-07-13 RX ADMIN — CARVEDILOL 25 MG: 25 TABLET, FILM COATED ORAL at 09:33

## 2023-07-13 RX ADMIN — HEPARIN SODIUM 2800 UNITS: 1000 INJECTION INTRAVENOUS; SUBCUTANEOUS at 20:05

## 2023-07-13 RX ADMIN — Medication 10 ML: at 09:33

## 2023-07-13 RX ADMIN — Medication 2 PUFF: at 08:49

## 2023-07-13 RX ADMIN — Medication 10 ML: at 20:06

## 2023-07-13 RX ADMIN — AMLODIPINE BESYLATE 5 MG: 5 TABLET ORAL at 20:36

## 2023-07-13 RX ADMIN — VALSARTAN 320 MG: 80 TABLET, FILM COATED ORAL at 09:32

## 2023-07-13 RX ADMIN — Medication 10 MG: at 20:36

## 2023-07-13 RX ADMIN — CEFEPIME 2000 MG: 2 INJECTION, POWDER, FOR SOLUTION INTRAVENOUS at 16:59

## 2023-07-13 RX ADMIN — PANTOPRAZOLE SODIUM 40 MG: 40 INJECTION, POWDER, FOR SOLUTION INTRAVENOUS at 20:36

## 2023-07-13 RX ADMIN — AMLODIPINE BESYLATE 5 MG: 5 TABLET ORAL at 09:33

## 2023-07-13 RX ADMIN — Medication 10 ML: at 20:36

## 2023-07-13 RX ADMIN — CARVEDILOL 25 MG: 25 TABLET, FILM COATED ORAL at 16:52

## 2023-07-13 RX ADMIN — PANTOPRAZOLE SODIUM 40 MG: 40 INJECTION, POWDER, FOR SOLUTION INTRAVENOUS at 09:33

## 2023-07-13 RX ADMIN — Medication 2 PUFF: at 20:37

## 2023-07-13 RX ADMIN — HEPARIN SODIUM 1380 UNITS/HR: 10000 INJECTION, SOLUTION INTRAVENOUS at 15:47

## 2023-07-13 RX ADMIN — Medication 400 MG: at 09:33

## 2023-07-13 ASSESSMENT — ENCOUNTER SYMPTOMS
ALLERGIC/IMMUNOLOGIC NEGATIVE: 1
EYES NEGATIVE: 1
RESPIRATORY NEGATIVE: 1
GASTROINTESTINAL NEGATIVE: 1

## 2023-07-13 ASSESSMENT — PAIN SCALES - GENERAL
PAINLEVEL_OUTOF10: 0

## 2023-07-13 NOTE — ED NOTES
Per Dr Reji Banerjee, cont to hold Heparin at this time. Per Dr Foreign Jorge informed of communications with Vascular regarding Heparin and will manage in conjunction with patients repeat lab work.       Smita Leon RN  07/12/23 [id number]

## 2023-07-13 NOTE — ED NOTES
Per Dr Brian Bonner. Heparin infusion stopped at this time. Repeat APTT collected at this time. PT/INR ordered as well.      Rolly Pedro RN  07/12/23 [id number]

## 2023-07-13 NOTE — PROGRESS NOTES
4 Eyes Skin Assessment     NAME:  Souleymane Bonds  YOB: 1960  MEDICAL RECORD NUMBER:  [id number]    The patient is being assessed for  Admission    I agree that at least one RN has performed a thorough Head to Toe Skin Assessment on the patient. ALL assessment sites listed below have been assessed. Areas assessed by both nurses:    Head, Face, Ears, Shoulders, Back, Chest, Arms, Elbows, Hands, Sacrum. Buttock, Coccyx, Ischium, and Legs. Feet and Heels        Does the Patient have a Wound?  No noted wound(s)       David Prevention initiated by RN: No  Wound Care Orders initiated by RN: No    Pressure Injury (Stage 3,4, Unstageable, DTI, NWPT, and Complex wounds) if present, place Wound referral order by RN under : No    New Ostomies, if present place, Ostomy referral order under : No     Nurse 1 eSignature: Electronically signed by Eusebia Cruz RN on 7/12/23 at 11:23 PM EDT    **SHARE this note so that the co-signing nurse can place an eSignature**    Nurse 2 eSignature: Electronically signed by Latoya Chapman RN on 7/12/23 at 11:26 PM EDT

## 2023-07-13 NOTE — PROGRESS NOTES
Patient admitted to room 450 from ED. Patient oriented to room, call light, bed rails, phone, lights and bathroom. Patient instructed about the schedule of the day including: vital sign frequency, lab draws, possible tests, frequency of MD and staff rounds, daily weights, I &O's and prescribed diet. Telemetry box in place, patient aware of placement and reason. Bed locked, in lowest position, side rails up 2/4, call light within reach.

## 2023-07-13 NOTE — PROGRESS NOTES
V2.0    [facility] [place] Progress Note      Name:  Fady Elizondo /Age/Sex: 1960  (59 y.o. female)   MRN & CSN:  [id number] & [id number] Encounter Date/Time: 2023 3:03 PM EDT   Location:  [id number] PCP: Lord Sherry MD     [address] [place] [address]       Hospital Day: 2    Assessment and Recommendations   Fady Elizondo is a 59 y.o. female with pmh of hypertension, hyperlipidemia, nonischemic cardiomyopathy/ICD (EF improved from 20% to 55% in ), GERD and bipolar disorder who presents with left-sided chest pain and shortness of breath as well as rectal bleeding. She was treated for pneumonia with normal 611 on Levaquin for about a week. She has also been having fresh rectal bleeding for about a week. WBC 16,000. Potassium 3.4. Magnesium 1.7. CTA chest-left pulmonary embolism. CT abdomen-thickening cecum questionable for neoplasm. She had colonoscopy 3 months ago that revealed only polyps. 1.  Acute left pulmonary embolism: Evaluated by pulmonology and vascular surgery. No cardiac strain-echo is pending. Currently on heparin drip-will transition to oral anticoagulant tomorrow if there is no rectal bleed and hemoglobin remains stable. 2.  Rectal bleeding: GI consult appreciated. She had colonoscopy 3 months ago that revealed only polyps. No plan to do colonoscopy at this time. Monitor H&H.    3.  Hypokalemia/hypomagnesemia: Replace and monitor. 4.  Leukocytosis: CT chest-no pneumonia. Possibly reactive. Chronic problems include hypertension, hyperlipidemia, nonischemic cardiomyopathy/ICD (last EF 55% in ), GERD, obesity and bipolar disorder. Diet Diet NPO Exceptions are: Sips of Water with Meds   DVT Prophylaxis [] Lovenox, []  Heparin, [] SCDs, [] Ambulation,  [] Eliquis, [] Xarelto  [] Coumadin   Code Status Full Code   Disposition From: She lives with her .   Expected Disposition: Home  Estimated Date of Discharge: Possibly tomorrow if

## 2023-07-14 VITALS
BODY MASS INDEX: 34.19 KG/M2 | SYSTOLIC BLOOD PRESSURE: 133 MMHG | TEMPERATURE: 98.3 F | HEIGHT: 64 IN | RESPIRATION RATE: 18 BRPM | OXYGEN SATURATION: 94 % | DIASTOLIC BLOOD PRESSURE: 58 MMHG | HEART RATE: 87 BPM | WEIGHT: 200.3 LBS

## 2023-07-14 LAB
ANTI-XA UNFRAC HEPARIN: 0.22 IU/ML (ref 0.3–0.7)
ANTI-XA UNFRAC HEPARIN: 0.32 IU/ML (ref 0.3–0.7)
DEPRECATED RDW RBC AUTO: 13.5 % (ref 12.4–15.4)
GLUCOSE BLD-MCNC: 105 MG/DL (ref 70–99)
GLUCOSE BLD-MCNC: 114 MG/DL (ref 70–99)
GLUCOSE BLD-MCNC: 115 MG/DL (ref 70–99)
HCT VFR BLD AUTO: 28.6 % (ref 36–48)
HCT VFR BLD AUTO: 28.6 % (ref 36–48)
HGB BLD-MCNC: 9.8 G/DL (ref 12–16)
HGB BLD-MCNC: 9.8 G/DL (ref 12–16)
MCH RBC QN AUTO: 31.7 PG (ref 26–34)
MCHC RBC AUTO-ENTMCNC: 34.2 G/DL (ref 31–36)
MCV RBC AUTO: 92.6 FL (ref 80–100)
PERFORMED ON: ABNORMAL
PLATELET # BLD AUTO: 468 K/UL (ref 135–450)
PMV BLD AUTO: 7.8 FL (ref 5–10.5)
RBC # BLD AUTO: 3.09 M/UL (ref 4–5.2)
WBC # BLD AUTO: 12.9 K/UL (ref 4–11)

## 2023-07-14 PROCEDURE — 6370000000 HC RX 637 (ALT 250 FOR IP): Performed by: INTERNAL MEDICINE

## 2023-07-14 PROCEDURE — 6360000002 HC RX W HCPCS: Performed by: INTERNAL MEDICINE

## 2023-07-14 PROCEDURE — 2580000003 HC RX 258: Performed by: INTERNAL MEDICINE

## 2023-07-14 PROCEDURE — 96361 HYDRATE IV INFUSION ADD-ON: CPT

## 2023-07-14 PROCEDURE — 96368 THER/DIAG CONCURRENT INF: CPT

## 2023-07-14 PROCEDURE — G0378 HOSPITAL OBSERVATION PER HR: HCPCS

## 2023-07-14 PROCEDURE — 6360000002 HC RX W HCPCS: Performed by: EMERGENCY MEDICINE

## 2023-07-14 PROCEDURE — 85014 HEMATOCRIT: CPT

## 2023-07-14 PROCEDURE — 85027 COMPLETE CBC AUTOMATED: CPT

## 2023-07-14 PROCEDURE — 6370000000 HC RX 637 (ALT 250 FOR IP): Performed by: EMERGENCY MEDICINE

## 2023-07-14 PROCEDURE — 2580000003 HC RX 258: Performed by: EMERGENCY MEDICINE

## 2023-07-14 PROCEDURE — 36415 COLL VENOUS BLD VENIPUNCTURE: CPT

## 2023-07-14 PROCEDURE — 85520 HEPARIN ASSAY: CPT

## 2023-07-14 PROCEDURE — C9113 INJ PANTOPRAZOLE SODIUM, VIA: HCPCS | Performed by: ANESTHESIOLOGY

## 2023-07-14 PROCEDURE — 96376 TX/PRO/DX INJ SAME DRUG ADON: CPT

## 2023-07-14 PROCEDURE — 2580000003 HC RX 258: Performed by: ANESTHESIOLOGY

## 2023-07-14 PROCEDURE — 94640 AIRWAY INHALATION TREATMENT: CPT

## 2023-07-14 PROCEDURE — 85018 HEMOGLOBIN: CPT

## 2023-07-14 PROCEDURE — 96366 THER/PROPH/DIAG IV INF ADDON: CPT

## 2023-07-14 PROCEDURE — 6360000002 HC RX W HCPCS: Performed by: ANESTHESIOLOGY

## 2023-07-14 PROCEDURE — 6370000000 HC RX 637 (ALT 250 FOR IP): Performed by: ANESTHESIOLOGY

## 2023-07-14 RX ORDER — HEPARIN SODIUM 1000 [USP'U]/ML
2800 INJECTION, SOLUTION INTRAVENOUS; SUBCUTANEOUS ONCE
Status: COMPLETED | OUTPATIENT
Start: 2023-07-14 | End: 2023-07-14

## 2023-07-14 RX ORDER — LANOLIN ALCOHOL/MO/W.PET/CERES
400 CREAM (GRAM) TOPICAL DAILY
Qty: 7 TABLET | Refills: 0 | Status: SHIPPED | OUTPATIENT
Start: 2023-07-15 | End: 2023-07-22

## 2023-07-14 RX ADMIN — Medication 400 MG: at 08:52

## 2023-07-14 RX ADMIN — PANTOPRAZOLE SODIUM 40 MG: 40 INJECTION, POWDER, FOR SOLUTION INTRAVENOUS at 08:52

## 2023-07-14 RX ADMIN — HEPARIN SODIUM 2800 UNITS: 1000 INJECTION INTRAVENOUS; SUBCUTANEOUS at 08:56

## 2023-07-14 RX ADMIN — CARVEDILOL 25 MG: 25 TABLET, FILM COATED ORAL at 08:52

## 2023-07-14 RX ADMIN — APIXABAN 10 MG: 5 TABLET, FILM COATED ORAL at 14:23

## 2023-07-14 RX ADMIN — HEPARIN SODIUM 1520 UNITS/HR: 10000 INJECTION, SOLUTION INTRAVENOUS at 05:24

## 2023-07-14 RX ADMIN — Medication 10 ML: at 08:54

## 2023-07-14 RX ADMIN — Medication 2 PUFF: at 09:30

## 2023-07-14 RX ADMIN — Medication 10 ML: at 14:24

## 2023-07-14 RX ADMIN — CEFEPIME 2000 MG: 2 INJECTION, POWDER, FOR SOLUTION INTRAVENOUS at 14:38

## 2023-07-14 RX ADMIN — VALSARTAN 320 MG: 80 TABLET, FILM COATED ORAL at 08:52

## 2023-07-14 RX ADMIN — AMLODIPINE BESYLATE 5 MG: 5 TABLET ORAL at 08:54

## 2023-07-14 RX ADMIN — CEFEPIME 2000 MG: 2 INJECTION, POWDER, FOR SOLUTION INTRAVENOUS at 05:26

## 2023-07-14 RX ADMIN — ATORVASTATIN CALCIUM 20 MG: 10 TABLET, FILM COATED ORAL at 08:52

## 2023-07-14 ASSESSMENT — PAIN SCALES - GENERAL
PAINLEVEL_OUTOF10: 0
PAINLEVEL_OUTOF10: 0

## 2023-07-14 NOTE — DISCHARGE INSTRUCTIONS
Recommendations: Follow up to discuss scheduling an outpatient sleep study with your Pulmonologist.     Follow up with Gastroenterologist once discharged, especially soon if bloody stools appear again.

## 2023-07-14 NOTE — DISCHARGE SUMMARY
Discharge instructions, along with upcoming appointment information and new medications reviewed with patient; pt verbalized understanding. Tele monitor removed & logged, CMU aware. Bilateral peripheral IVs removed w/o complication. Catheters intact, no bleeding at sites, dressings applied, pt tolerated well. Patient dressed and personal belongings packed. Patient's  picked up new prescriptions from outpatient pharmacy. Writer wheeled patient out to 's vehicle at front entrance.
07/12/2023 01:32 PM     No Growth to date. Any change in status will be called. Lab Results   Component Value Date/Time    BLOODCULT2  07/12/2023 02:17 PM     No Growth to date. Any change in status will be called.          Time Spent Discharging patient 39 minutes    Electronically signed by Tahir Ngo MD on 7/14/2023 at 2:23 PM

## 2023-07-14 NOTE — PROGRESS NOTES
Patient's BLE doppler 7/13 showed a DVT RLE. Spoke with Vascular today who recs Eliquis and d/c with no intervention, and referenced his consult note from yesterday. Currently still on Heparin gtt. GI was waiting in the background until doppler was completed and reviewed. Occult stool was sent 7/13 and was negative. Upgraded by cross cover MD to a full liquid diet, patient requesting a general diet and wants to go home today. Paged Attending to request update on plan of care. Awaiting response. Per provider - Okay to upgrade to regular diet. The plan is if Hgb is stable and patient is not having rectal bleed, she will be discharged on oral AC today.

## 2023-07-14 NOTE — PLAN OF CARE
Problem: Discharge Planning  Goal: Discharge to home or other facility with appropriate resources  Outcome: Progressing  Flowsheets (Taken 7/13/2023 0930 by Noe Pop RN)  Discharge to home or other facility with appropriate resources:   Identify barriers to discharge with patient and caregiver   Arrange for needed discharge resources and transportation as appropriate     Problem: Pain  Goal: Verbalizes/displays adequate comfort level or baseline comfort level  Outcome: Progressing  Flowsheets (Taken 7/13/2023 1647 by Noe Pop RN)  Verbalizes/displays adequate comfort level or baseline comfort level:   Encourage patient to monitor pain and request assistance   Assess pain using appropriate pain scale   Administer analgesics based on type and severity of pain and evaluate response   Implement non-pharmacological measures as appropriate and evaluate response     Problem: Safety - Adult  Goal: Free from fall injury  Outcome: Progressing     Problem: Chronic Conditions and Co-morbidities  Goal: Patient's chronic conditions and co-morbidity symptoms are monitored and maintained or improved  Outcome: Progressing  Flowsheets (Taken 7/13/2023 0930 by Noe Pop RN)  Care Plan - Patient's Chronic Conditions and Co-Morbidity Symptoms are Monitored and Maintained or Improved:   Monitor and assess patient's chronic conditions and comorbid symptoms for stability, deterioration, or improvement   Collaborate with multidisciplinary team to address chronic and comorbid conditions and prevent exacerbation or deterioration   Update acute care plan with appropriate goals if chronic or comorbid symptoms are exacerbated and prevent overall improvement and discharge

## 2023-07-14 NOTE — CARE COORDINATION
CASE MANAGEMENT DISCHARGE SUMMARY      Discharge to: home w/. Pt denied any needs. IMM given: 7/14/23    New Durable Medical Equipment ordered/agency: NONE    Transportation:    Family/car: private w/housband    Confirmed discharge plan with:     Patient: yes     Family:  yes  at bedside.       RN, name: Adelia Chávez
Chart reviewed day 2. Followed by IM/Vascular/Pulm/GI. BLE doppler revealed DVT; per vascular recs to start Eliquis and d/c w/no intervention. Pt now on a regular diet. If Hgb stable and tolerates diet pt can d/c today after 1600 IV abx admin. Pt IPTA w/. Do not foresee any needs at this time. Will follow for needs as they arise.  Electronically signed by Isra Garcia RN on 7/14/2023 at 11:28 AM
others, and if so, who? No  Plans to Return to Present Housing: Yes  Other Identified Issues/Barriers to RETURNING to current housing: none  Potential Assistance needed at discharge: N/A            Potential DME:    Patient expects to discharge to: [address] [place] [address] for transportation at discharge: Self    Financial    Payor: Megan Iglesias / Plan: Carlos [employer] PPO / Product Type: Medicare /     Does insurance require precert for SNF: Yes    Potential assistance Purchasing Medications: No  Meds-to-Beds request:        [facility] [address] [place], [address] [place] [address]  Phone: [phone] Fax: [phone]      Notes:    Factors facilitating achievement of predicted outcomes: Family support, Motivated, Cooperative, Pleasant, and Sense of humor    Barriers to discharge: Heparin drip for PE    Additional Case Management Notes: a&o. IPTA in a ranch style house w/. Pt denies any use  of DMEs at home. Pt denies for seeing any needs at d/c. Pt followed by IM/Vascular/Pulm/GI. Per GI trend H/H w/no further gi plans at this time. At this time writer awaiting notes/rounds from other MDs to know a further plan. Pt still on heparin drip and some SOB w/ambulation. Electronically signed by Candie Parry RN on 7/13/2023 at 11:57 AM      The Plan for Transition of Care is related to the following treatment goals of Hypokalemia [E87.6]  Hypomagnesemia [E83.42]  Tachycardia [R00.0]  PE (pulmonary thromboembolism) ([address]) [I26.99]  Flank pain [R10.9]  Cecal lesion [K63.9]  Chest pain, unspecified type [R07.9]  Other acute pulmonary embolism, unspecified whether acute cor pulmonale present ([address]) [O84.59]    IF APPLICABLE: The Patient and/or patient representative Nathaly Howe and her family were provided with a choice of provider and agrees with the discharge plan.  Freedom of choice list with basic dialogue that supports the patient's individualized plan of

## 2023-07-16 LAB
BACTERIA BLD CULT ORG #2: NORMAL
BACTERIA BLD CULT: NORMAL

## 2023-08-08 ENCOUNTER — NURSE ONLY (OUTPATIENT)
Dept: CARDIOLOGY CLINIC | Age: 63
End: 2023-08-08
Payer: MEDICARE

## 2023-08-08 ENCOUNTER — OFFICE VISIT (OUTPATIENT)
Dept: CARDIOLOGY CLINIC | Age: 63
End: 2023-08-08
Payer: MEDICARE

## 2023-08-08 VITALS
BODY MASS INDEX: 31.95 KG/M2 | WEIGHT: 186.13 LBS | OXYGEN SATURATION: 97 % | SYSTOLIC BLOOD PRESSURE: 118 MMHG | HEART RATE: 81 BPM | DIASTOLIC BLOOD PRESSURE: 56 MMHG

## 2023-08-08 DIAGNOSIS — Z95.810 PRESENCE OF CARDIAC RESYNCHRONIZATION THERAPY DEFIBRILLATOR (CRT-D): ICD-10-CM

## 2023-08-08 DIAGNOSIS — Z45.02 ICD (IMPLANTABLE CARDIOVERTER-DEFIBRILLATOR) BATTERY DEPLETION: ICD-10-CM

## 2023-08-08 DIAGNOSIS — Z95.810 PRESENCE OF CARDIAC RESYNCHRONIZATION THERAPY DEFIBRILLATOR (CRT-D): Primary | ICD-10-CM

## 2023-08-08 DIAGNOSIS — R00.2 PALPITATIONS: Primary | ICD-10-CM

## 2023-08-08 PROCEDURE — 93000 ELECTROCARDIOGRAM COMPLETE: CPT | Performed by: INTERNAL MEDICINE

## 2023-08-08 PROCEDURE — 3074F SYST BP LT 130 MM HG: CPT | Performed by: INTERNAL MEDICINE

## 2023-08-08 PROCEDURE — 99214 OFFICE O/P EST MOD 30 MIN: CPT | Performed by: INTERNAL MEDICINE

## 2023-08-08 PROCEDURE — 93284 PRGRMG EVAL IMPLANTABLE DFB: CPT | Performed by: INTERNAL MEDICINE

## 2023-08-08 PROCEDURE — 3078F DIAST BP <80 MM HG: CPT | Performed by: INTERNAL MEDICINE

## 2023-08-08 NOTE — PATIENT INSTRUCTIONS
Plan:  Remote device checks every 3 months. Continue current medications. Follow up in one year, or sooner if needed.

## 2023-08-08 NOTE — PROGRESS NOTES
diastolic filling pressure. Mild mitral regurgitation. Mild to moderate aortic regurgitation. Systolic pulmonary artery pressure (SPAP) is normal and estimated at 21 mmHg   (RA pressure 3 mmHg). Last echo on 3/23/2017 showed EF of 50%. Objective:  Physical Exam   Constitutional: She is oriented to person, place, and time. She appears well-developed and well-nourished. HENT:   Head: Normocephalic and atraumatic. Eyes: Pupils are equal, round, and reactive to light. Neck: Normal range of motion. Cardiovascular: Normal rate, regular rhythm and normal heart sounds. Pulmonary/Chest: Effort normal and breath sounds normal.   Abdominal: Soft. No tenderness. Musculoskeletal: Normal range of motion. She exhibits no edema. Neurological: She is alert and oriented to person, place, and time. Skin: Skin is warm and dry. Psychiatric: She has a normal mood and affect. Assessment:  1. NICM - normal function of Bi-V ICD, no changes on device settings. Her current V to V setting is in adaptive mode. Her most recent echo demonstrates normal left ventricular function. 2. LBBB - demonstrated on EKG without pacing. 3. CRT-D - EF 02/2022 at 55%  4. Acute pulmonary embolism- on Eliquis. Plan:  Remote device checks every 3 months. Continue current medications. Follow up in one year, or sooner if needed. Solomon Schmitz RN, am scribing for and in the presence of Dr. Misael Edwards. 08/08/23 2:51 PM   Alban Mixon RN    I, Dr. Misael Edwards, personally performed the services described in this documentation as scribed by Alban Mixon RN in my presence, and it is both accurate and complete. QUALITY MEASURES  1. Tobacco Cessation Counseling: NA  2. Retake of BP if >140/90:   NA  3. Documentation to PCP/referring for new patient:  Sent to PCP at close of office visit  4. CAD patient on anti-platelet: NA  5. CAD patient on STATIN therapy:  Yes  6.  Patient with CHF

## 2023-08-29 ENCOUNTER — TELEPHONE (OUTPATIENT)
Dept: PULMONOLOGY | Age: 63
End: 2023-08-29

## 2023-09-05 NOTE — TELEPHONE ENCOUNTER
Canceled ct prior to pt's appt on 9/20/23.
I will see her as scheduled.
Thank you for requesting -- I requested radiology merge her PACS charts. I called pt - she is asymptomatic and feels fine, no infectious sxs. Please cancel LCS prior to appt, keep the currently scheduled appt. - pt is aware of this plan. Reviewed images with Dr. Clarisa Conrad.
[facility] radiology by calling [phone]     FINDINGS:   LUNGS/AIRWAYS:  Nodule is seen in the minor fissure, stable in the right lung. Dense consolidation which is rounded and masslike in the left lower lobe is present. Volume loss is also seen in the left lung   PLEURA: Left pleural effusion unchanged. MEDIASTINUM/PATIENCE:  Unremarkable   HEART/PERICARDIUM:  Mild coronary artery calcifications   VESSELS:  Unremarkable. No aneurysm   CHEST WALL/LOWER NECK:  Unremarkable   UPPER ABDOMEN:  Described on concurrent CT abdomen report   BONES:  Unremarkable   OTHER:  Bilateral breast implants appear intact. IMPRESSION       Unchanged left pleural effusion with condensing opacity in the left lower lobe. Infarct versus pneumonia.  Continued imaging surveillance recommended   Stable benign-appearing right middle lobe fissural nodule   Nonvisualization of the pulmonary embolus seen in July  Exam End: 08/28/23 10:49    Specimen Collected: 08/28/23 14:28 Last Resulted: 08/28/23 14:38   [facility]  Result Received: 08/29/23 13:34

## 2023-09-20 ENCOUNTER — OFFICE VISIT (OUTPATIENT)
Dept: PULMONOLOGY | Age: 63
End: 2023-09-20
Payer: MEDICARE

## 2023-09-20 VITALS
HEIGHT: 64 IN | WEIGHT: 187 LBS | HEART RATE: 86 BPM | DIASTOLIC BLOOD PRESSURE: 74 MMHG | BODY MASS INDEX: 31.92 KG/M2 | SYSTOLIC BLOOD PRESSURE: 136 MMHG | OXYGEN SATURATION: 98 %

## 2023-09-20 DIAGNOSIS — R93.89 ABNORMAL CHEST CT: Primary | ICD-10-CM

## 2023-09-20 DIAGNOSIS — J45.40 MODERATE PERSISTENT ASTHMA WITHOUT COMPLICATION: ICD-10-CM

## 2023-09-20 PROCEDURE — 3075F SYST BP GE 130 - 139MM HG: CPT | Performed by: INTERNAL MEDICINE

## 2023-09-20 PROCEDURE — 99214 OFFICE O/P EST MOD 30 MIN: CPT | Performed by: INTERNAL MEDICINE

## 2023-09-20 PROCEDURE — 3078F DIAST BP <80 MM HG: CPT | Performed by: INTERNAL MEDICINE

## 2023-09-20 RX ORDER — FLUTICASONE FUROATE, UMECLIDINIUM BROMIDE AND VILANTEROL TRIFENATATE 200; 62.5; 25 UG/1; UG/1; UG/1
1 POWDER RESPIRATORY (INHALATION) DAILY
Qty: 1 EACH | Refills: 5 | Status: SHIPPED | OUTPATIENT
Start: 2023-09-20

## 2023-09-20 NOTE — PROGRESS NOTES
PULMONARY, CRITICAL CARE AND SLEEP MEDICINE     CC/REFERRING PROVIDER:  Patient is being seen at the request of Dr. Viktor Samaniego for a consultation for shortness of breath      Interval History:     Had PE in June 2023, still with right sided pleuritic pain  doing okay with Breo. Uses every day in summer, uses albuterol prior to going out on hot/humid days. Needing albuterol several times right now. Uses breo 2-3 X per week in wintertime. PRESENTING HPI: 62 yo WF with non ischemic cardiomyopathy, over the winter 2018 debilitating shortness of breath and cough, lost voice. Over the last few weeks, with central air much less coughing and breathing is better. Over the last few days, coughing more at night. No family history of asthma, but did have asthma as a child. Of note, she cannot take a single big breath without coughing, she cannot exercise at all without shortness of breath and cough. reports that she quit smoking about 8 years ago. Her smoking use included cigarettes. She has a 16.50 pack-year smoking history. She has never used smokeless tobacco.  Just less than 1 ppd X 35 years       PHYSICAL EXAM:  Blood pressure 136/74, pulse 86, height 5' 4\" (1.626 m), weight 187 lb (84.8 kg), SpO2 98 %.'  Constitutional:  No acute distress. HENT:  Oropharynx is clear and moist.   Neck: No tracheal deviation present. Cardiovascular: Normal heart sounds. No lower extremity edema. Pulmonary/Chest: No wheezes. No rhonchi. No rales. No decreased breath sounds. No accessory muscle usage or stridor. Musculoskeletal: No cyanosis. No clubbing. Skin: Skin is warm and dry. Psychiatric: Normal mood and affect. Neurologic: speech fluent, alert and oriented, strength symmetric      DATA:  CTPA 7/12/23  Pulmonary embolus is seen in the distal aspect of the left main pulmonary  artery extending into the left upper lobe and left lower lobe pulmonary  artery.    Small left-sided pleural effusion is seen with

## 2023-09-24 PROCEDURE — 93295 DEV INTERROG REMOTE 1/2/MLT: CPT | Performed by: INTERNAL MEDICINE

## 2023-09-24 PROCEDURE — 93296 REM INTERROG EVL PM/IDS: CPT | Performed by: INTERNAL MEDICINE

## 2023-12-29 PROCEDURE — 93295 DEV INTERROG REMOTE 1/2/MLT: CPT | Performed by: INTERNAL MEDICINE

## 2023-12-29 PROCEDURE — 93296 REM INTERROG EVL PM/IDS: CPT | Performed by: INTERNAL MEDICINE

## 2024-01-03 ENCOUNTER — HOSPITAL ENCOUNTER (OUTPATIENT)
Dept: CT IMAGING | Age: 64
Discharge: HOME | End: 2024-01-03
Payer: MEDICARE

## 2024-01-03 DIAGNOSIS — R93.89 ABNORMAL CHEST CT: ICD-10-CM

## 2024-01-03 PROCEDURE — 71250 CT THORAX DX C-: CPT

## 2024-01-09 ENCOUNTER — OFFICE VISIT (OUTPATIENT)
Dept: PULMONOLOGY | Age: 64
End: 2024-01-09
Payer: MEDICARE

## 2024-01-09 VITALS
HEART RATE: 86 BPM | DIASTOLIC BLOOD PRESSURE: 62 MMHG | SYSTOLIC BLOOD PRESSURE: 124 MMHG | HEIGHT: 64 IN | WEIGHT: 187.4 LBS | OXYGEN SATURATION: 99 % | RESPIRATION RATE: 14 BRPM | BODY MASS INDEX: 31.99 KG/M2

## 2024-01-09 DIAGNOSIS — Z79.01 ANTICOAGULATED: ICD-10-CM

## 2024-01-09 DIAGNOSIS — J45.40 MODERATE PERSISTENT ASTHMA WITHOUT COMPLICATION: Primary | ICD-10-CM

## 2024-01-09 DIAGNOSIS — Z86.711 HISTORY OF PULMONARY EMBOLISM: ICD-10-CM

## 2024-01-09 PROCEDURE — 3078F DIAST BP <80 MM HG: CPT | Performed by: INTERNAL MEDICINE

## 2024-01-09 PROCEDURE — 3074F SYST BP LT 130 MM HG: CPT | Performed by: INTERNAL MEDICINE

## 2024-01-09 PROCEDURE — 99214 OFFICE O/P EST MOD 30 MIN: CPT | Performed by: INTERNAL MEDICINE

## 2024-01-09 RX ORDER — FLUTICASONE FUROATE AND VILANTEROL 200; 25 UG/1; UG/1
1 POWDER RESPIRATORY (INHALATION) DAILY
Qty: 3 EACH | Refills: 3 | Status: SHIPPED | OUTPATIENT
Start: 2024-01-09

## 2024-01-09 NOTE — PROGRESS NOTES
mm left adrenal nodule.  Compare with older exams if available..    LE U/S  7/12/23 RLE DVT    PFT 10/31/18 FEV1 2.42 L 92% TLC 4.98 L 95%  DLCO 18.64 79%    ASSESSMENT:  Moderate persistent asthma - incompletely controlled  PE, July 2023  Left adrenal nodule is stable & is a lipid rich adenoma on MRI at [facility]     Not addressed today  Pulmonary Nodules   Non ischemic cardiomyopathy, f/b Dr. Calhoun   Biventricular pacemaker  Family h/o lung cancer   31 pack year tobacco, quit in 2017    PLAN:  Low dose chest CT for lung cancer screening in Jan 2025, will schedule at future visit, was discussed today   Eliquis 2.5 BID per the AMPLIFY-EXT trial   Breo 200/25, albuterol PRN for asthma  See me in about 6 months

## 2024-01-12 PROCEDURE — 93297 REM INTERROG DEV EVAL ICPMS: CPT | Performed by: INTERNAL MEDICINE

## 2024-02-13 DIAGNOSIS — I50.22 SYSTOLIC CHF, CHRONIC (HCC): ICD-10-CM

## 2024-02-13 DIAGNOSIS — I10 ESSENTIAL HYPERTENSION: ICD-10-CM

## 2024-02-13 NOTE — TELEPHONE ENCOUNTER
2/13- called pt @[phone]. Unable to make contact. LVM informing pt to call back to schedule annual apt with [facility]. Will try again at a later date.    LOV 2/12/2023 kayleigh/MATEO

## 2024-02-15 NOTE — TELEPHONE ENCOUNTER
2/15- second attempt. called pt @[phone]. Unable to make contact. LVM informing pt to call back to schedule annual apt with [facility]. Next step is to send letter.     LOV 2/12/2023 Barrett

## 2024-02-20 RX ORDER — CARVEDILOL 25 MG/1
25 TABLET ORAL 2 TIMES DAILY WITH MEALS
Qty: 180 TABLET | Refills: 0 | Status: SHIPPED | OUTPATIENT
Start: 2024-02-20

## 2024-02-20 NOTE — TELEPHONE ENCOUNTER
2/20- called pt @[phone]. Pt is now scheduled 4/23/2024 lance with [facility]O for annual apt. Please review med request.

## 2024-03-02 DIAGNOSIS — I50.22 SYSTOLIC CHF, CHRONIC (HCC): ICD-10-CM

## 2024-03-02 DIAGNOSIS — I10 ESSENTIAL HYPERTENSION: ICD-10-CM

## 2024-03-05 RX ORDER — VALSARTAN 320 MG/1
320 TABLET ORAL DAILY
Qty: 90 TABLET | Refills: 1 | Status: SHIPPED | OUTPATIENT
Start: 2024-03-05

## 2024-03-13 DIAGNOSIS — I50.22 SYSTOLIC CHF, CHRONIC (HCC): ICD-10-CM

## 2024-03-13 DIAGNOSIS — I10 ESSENTIAL HYPERTENSION: ICD-10-CM

## 2024-03-13 RX ORDER — VALSARTAN 320 MG/1
320 TABLET ORAL DAILY
Qty: 30 TABLET | Refills: 1 | Status: SHIPPED | OUTPATIENT
Start: 2024-03-13

## 2024-03-29 PROCEDURE — 93296 REM INTERROG EVL PM/IDS: CPT | Performed by: INTERNAL MEDICINE

## 2024-03-29 PROCEDURE — 93295 DEV INTERROG REMOTE 1/2/MLT: CPT | Performed by: INTERNAL MEDICINE

## 2024-04-17 NOTE — PROGRESS NOTES
hypercholesterolemia    4. SOB (shortness of breath)    5. Non-ischemic cardiomyopathy (HCC)    6. ICD (implantable cardioverter-defibrillator) in place    7. Chronic pulmonary embolism, unspecified pulmonary embolism type, unspecified whether acute cor pulmonale present (HCC)    8. Tobacco use        Impedance monitoring via Medtronic Optivol Cardiac Compass was downloaded from patient's ICD and reviewed.  The impedance shows stable fluid level.      Plan:  Continue current cardiac medications  Start varenicline for smoking cessation  Labs now: fasting lipids, cbc, cmp, bnp  Extensively discussed the importance of smoking cessation 1800 QUIT NOW  Echo in 6 months  Follow up with me in 6 months       This note was scribed in the presence of Moriah Candelaria MD by Madeline Leal RN    The scribe's documentation has been prepared under my direction and personally reviewed by me in its entirety.  I confirm that the note above accurately reflects all work, treatment, procedures, and medical decision making performed by me.      Time Based Itemization  A total of 40 minutes was spent on today's patient encounter.  If applicable, non-patient-facing activities:  ( x)Preparing to see the patient and reviewing records  ( ) Individual interpretation of results  ( ) Discussion or coordination of care with other health care professionals  ( x) Ordering of unique tests, medications, or procedures  ( x) Documentation within the EHR

## 2024-04-23 ENCOUNTER — OFFICE VISIT (OUTPATIENT)
Dept: CARDIOLOGY CLINIC | Age: 64
End: 2024-04-23
Payer: MEDICARE

## 2024-04-23 VITALS
SYSTOLIC BLOOD PRESSURE: 150 MMHG | WEIGHT: 194 LBS | HEIGHT: 64 IN | BODY MASS INDEX: 33.12 KG/M2 | OXYGEN SATURATION: 97 % | DIASTOLIC BLOOD PRESSURE: 80 MMHG | HEART RATE: 85 BPM

## 2024-04-23 DIAGNOSIS — I10 ESSENTIAL HYPERTENSION: ICD-10-CM

## 2024-04-23 DIAGNOSIS — I27.82 CHRONIC PULMONARY EMBOLISM, UNSPECIFIED PULMONARY EMBOLISM TYPE, UNSPECIFIED WHETHER ACUTE COR PULMONALE PRESENT (HCC): ICD-10-CM

## 2024-04-23 DIAGNOSIS — Z72.0 TOBACCO USE: ICD-10-CM

## 2024-04-23 DIAGNOSIS — Z95.810 ICD (IMPLANTABLE CARDIOVERTER-DEFIBRILLATOR) IN PLACE: ICD-10-CM

## 2024-04-23 DIAGNOSIS — E78.00 PURE HYPERCHOLESTEROLEMIA: ICD-10-CM

## 2024-04-23 DIAGNOSIS — R06.02 SOB (SHORTNESS OF BREATH): ICD-10-CM

## 2024-04-23 DIAGNOSIS — I50.22 SYSTOLIC CHF, CHRONIC (HCC): Primary | ICD-10-CM

## 2024-04-23 DIAGNOSIS — I42.8 NON-ISCHEMIC CARDIOMYOPATHY (HCC): ICD-10-CM

## 2024-04-23 PROCEDURE — 3077F SYST BP >= 140 MM HG: CPT | Performed by: INTERNAL MEDICINE

## 2024-04-23 PROCEDURE — 3079F DIAST BP 80-89 MM HG: CPT | Performed by: INTERNAL MEDICINE

## 2024-04-23 PROCEDURE — 99215 OFFICE O/P EST HI 40 MIN: CPT | Performed by: INTERNAL MEDICINE

## 2024-04-23 RX ORDER — VARENICLINE TARTRATE 1 MG/1
TABLET, FILM COATED ORAL
Qty: 30 TABLET | Refills: 3 | Status: SHIPPED | OUTPATIENT
Start: 2024-04-23 | End: 2024-06-29

## 2024-04-23 RX ORDER — AMLODIPINE BESYLATE 10 MG/1
10 TABLET ORAL DAILY
COMMUNITY
Start: 2024-01-20

## 2024-04-23 NOTE — PATIENT INSTRUCTIONS
Your provider has ordered testing for further evaluation.  An order/prescription has been included in your paper work.   To schedule outpatient testing, contact Central Scheduling by calling [employer] ([phone]).        Plan:  Continue current cardiac medications  Start varenicline for smoking cessation  Labs now: fasting lipids, cbc, cmp, bnp  Extensively discussed the importance of smoking cessation 1800 QUIT NOW  Echo in 6 months  Follow up with me in 6 months

## 2024-05-16 ENCOUNTER — TELEPHONE (OUTPATIENT)
Dept: PULMONOLOGY | Age: 64
End: 2024-05-16

## 2024-05-20 ENCOUNTER — TELEPHONE (OUTPATIENT)
Dept: PULMONOLOGY | Age: 64
End: 2024-05-20

## 2024-06-09 DIAGNOSIS — I10 ESSENTIAL HYPERTENSION: ICD-10-CM

## 2024-06-09 DIAGNOSIS — R06.02 SOB (SHORTNESS OF BREATH): ICD-10-CM

## 2024-06-09 DIAGNOSIS — E78.00 PURE HYPERCHOLESTEROLEMIA: ICD-10-CM

## 2024-06-09 DIAGNOSIS — I50.22 SYSTOLIC CHF, CHRONIC (HCC): ICD-10-CM

## 2024-06-09 DIAGNOSIS — E55.9 VITAMIN D INSUFFICIENCY: ICD-10-CM

## 2024-06-10 RX ORDER — ATORVASTATIN CALCIUM 20 MG/1
20 TABLET, FILM COATED ORAL DAILY
Qty: 90 TABLET | Refills: 3 | Status: SHIPPED | OUTPATIENT
Start: 2024-06-10

## 2024-06-10 NOTE — TELEPHONE ENCOUNTER
LOV MATEO 4/23/24  Next OV MATEO 10/22/24  Lipid 5/2023 CE    Informed pt to get updated labs as ordered by MATEO

## 2024-07-05 DIAGNOSIS — I50.22 SYSTOLIC CHF, CHRONIC (HCC): ICD-10-CM

## 2024-07-05 DIAGNOSIS — I10 ESSENTIAL HYPERTENSION: ICD-10-CM

## 2024-07-08 RX ORDER — VALSARTAN 320 MG/1
320 TABLET ORAL DAILY
Qty: 30 TABLET | Refills: 2 | Status: SHIPPED | OUTPATIENT
Start: 2024-07-08

## 2024-07-08 NOTE — TELEPHONE ENCOUNTER
On 6/9/24, Marian was asked to get updated labs. No results in EPIC or Care Everywhere as of yet. Lab orders are in EPIC placed on 4/23/24.

## 2024-09-16 ENCOUNTER — TELEPHONE (OUTPATIENT)
Dept: PULMONOLOGY | Age: 64
End: 2024-09-16

## 2024-11-12 ENCOUNTER — HOSPITAL ENCOUNTER (OUTPATIENT)
Age: 64
Discharge: HOME | End: 2024-11-12
Payer: MEDICARE

## 2024-11-12 ENCOUNTER — HOSPITAL ENCOUNTER (OUTPATIENT)
Dept: CARDIOLOGY | Age: 64
Discharge: HOME | End: 2024-11-14
Attending: INTERNAL MEDICINE
Payer: MEDICARE

## 2024-11-12 VITALS
SYSTOLIC BLOOD PRESSURE: 150 MMHG | DIASTOLIC BLOOD PRESSURE: 80 MMHG | BODY MASS INDEX: 33.12 KG/M2 | WEIGHT: 194 LBS | HEIGHT: 64 IN

## 2024-11-12 DIAGNOSIS — I50.22 SYSTOLIC CHF, CHRONIC (HCC): ICD-10-CM

## 2024-11-12 DIAGNOSIS — R06.02 SOB (SHORTNESS OF BREATH): ICD-10-CM

## 2024-11-12 LAB
ALBUMIN SERPL-MCNC: 4.3 G/DL (ref 3.4–5)
ALBUMIN/GLOB SERPL: 1.5 {RATIO} (ref 1.1–2.2)
ALP SERPL-CCNC: 84 U/L (ref 40–129)
ALT SERPL-CCNC: 31 U/L (ref 10–40)
ANION GAP SERPL CALCULATED.3IONS-SCNC: 8 MMOL/L (ref 3–16)
AST SERPL-CCNC: 23 U/L (ref 15–37)
BASOPHILS # BLD: 0.1 K/UL (ref 0–0.2)
BASOPHILS NFR BLD: 0.5 %
BILIRUB SERPL-MCNC: 0.3 MG/DL (ref 0–1)
BUN SERPL-MCNC: 26 MG/DL (ref 7–20)
CALCIUM SERPL-MCNC: 9.9 MG/DL (ref 8.3–10.6)
CHLORIDE SERPL-SCNC: 107 MMOL/L (ref 99–110)
CO2 SERPL-SCNC: 23 MMOL/L (ref 21–32)
CREAT SERPL-MCNC: 1.1 MG/DL (ref 0.6–1.2)
DEPRECATED RDW RBC AUTO: 13.3 % (ref 12.4–15.4)
ECHO AO ARCH DIAM: 3.3 CM
ECHO AO ASC DIAM: 3.3 CM
ECHO AO ASCENDING AORTA INDEX: 1.71 CM/M2
ECHO AO ROOT DIAM: 2.9 CM
ECHO AO ROOT INDEX: 1.5 CM/M2
ECHO AR MAX VEL PISA: 3.9 M/S
ECHO AV AREA PEAK VELOCITY: 2.7 CM2
ECHO AV AREA VTI: 2.6 CM2
ECHO AV AREA/BSA PEAK VELOCITY: 1.4 CM2/M2
ECHO AV AREA/BSA VTI: 1.3 CM2/M2
ECHO AV CUSP MM: 1.9 CM
ECHO AV MEAN GRADIENT: 8 MMHG
ECHO AV MEAN VELOCITY: 1.3 M/S
ECHO AV PEAK GRADIENT: 16 MMHG
ECHO AV PEAK VELOCITY: 2 M/S
ECHO AV REGURGITANT PHT: 300 MS
ECHO AV VELOCITY RATIO: 0.75
ECHO AV VTI: 43.2 CM
ECHO BSA: 1.99 M2
ECHO EST RA PRESSURE: 8 MMHG
ECHO LA AREA 2C: 20.2 CM2
ECHO LA AREA 4C: 21 CM2
ECHO LA DIAMETER INDEX: 1.97 CM/M2
ECHO LA DIAMETER: 3.8 CM
ECHO LA MAJOR AXIS: 6.2 CM
ECHO LA MINOR AXIS: 5.5 CM
ECHO LA TO AORTIC ROOT RATIO: 1.31
ECHO LA VOL BP: 62 ML (ref 22–52)
ECHO LA VOL MOD A2C: 60 ML (ref 22–52)
ECHO LA VOL MOD A4C: 58 ML (ref 22–52)
ECHO LA VOL/BSA BIPLANE: 32 ML/M2 (ref 16–34)
ECHO LA VOLUME INDEX MOD A2C: 31 ML/M2 (ref 16–34)
ECHO LA VOLUME INDEX MOD A4C: 30 ML/M2 (ref 16–34)
ECHO LV E' LATERAL VELOCITY: 7.6 CM/S
ECHO LV E' SEPTAL VELOCITY: 9.4 CM/S
ECHO LV EDV 3D: 184 ML
ECHO LV EDV INDEX 3D: 95 ML/M2
ECHO LV EF PHYSICIAN: 50 %
ECHO LV EJECTION FRACTION 3D: 54 %
ECHO LV ESV 3D: 85 ML
ECHO LV ESV INDEX 3D: 44 ML/M2
ECHO LV FRACTIONAL SHORTENING: 31 % (ref 28–44)
ECHO LV GLOBAL LONGITUDINAL STRAIN (GLS): -14.5 %
ECHO LV GLOBAL LONGITUDINAL STRAIN (GLS): -16.3 %
ECHO LV GLOBAL LONGITUDINAL STRAIN (GLS): -16.8 %
ECHO LV GLOBAL LONGITUDINAL STRAIN (GLS): -19.7 %
ECHO LV INTERNAL DIMENSION DIASTOLE INDEX: 2.85 CM/M2
ECHO LV INTERNAL DIMENSION DIASTOLIC: 5.5 CM (ref 3.9–5.3)
ECHO LV INTERNAL DIMENSION SYSTOLIC INDEX: 1.97 CM/M2
ECHO LV INTERNAL DIMENSION SYSTOLIC: 3.8 CM
ECHO LV ISOVOLUMETRIC RELAXATION TIME (IVRT): 103 MS
ECHO LV IVSD: 1.2 CM (ref 0.6–0.9)
ECHO LV MASS 2D: 257 G (ref 67–162)
ECHO LV MASS 3D INDEX: 78.2 G/M2
ECHO LV MASS 3D: 151 G
ECHO LV MASS INDEX 2D: 133.2 G/M2 (ref 43–95)
ECHO LV POSTERIOR WALL DIASTOLIC: 1.1 CM (ref 0.6–0.9)
ECHO LV RELATIVE WALL THICKNESS RATIO: 0.4
ECHO LVOT AREA: 3.5 CM2
ECHO LVOT AV VTI INDEX: 0.75
ECHO LVOT DIAM: 2.1 CM
ECHO LVOT MEAN GRADIENT: 5 MMHG
ECHO LVOT PEAK GRADIENT: 9 MMHG
ECHO LVOT PEAK VELOCITY: 1.5 M/S
ECHO LVOT STROKE VOLUME INDEX: 58.1 ML/M2
ECHO LVOT SV: 112.2 ML
ECHO LVOT VTI: 32.4 CM
ECHO MV A VELOCITY: 1.04 M/S
ECHO MV E DECELERATION TIME (DT): 256 MS
ECHO MV E VELOCITY: 0.79 M/S
ECHO MV E/A RATIO: 0.76
ECHO MV E/E' LATERAL: 10.39
ECHO MV E/E' RATIO (AVERAGED): 9.4
ECHO MV E/E' SEPTAL: 8.4
ECHO RA AREA 4C: 11.3 CM2
ECHO RA END SYSTOLIC VOLUME APICAL 4 CHAMBER INDEX BSA: 10 ML/M2
ECHO RA VOLUME: 19 ML
ECHO RIGHT VENTRICULAR SYSTOLIC PRESSURE (RVSP): 24 MMHG
ECHO RV EJECTION FRACTION 3D: 28 %
ECHO RV FRACTIONAL AREA CHANGE: 30 %
ECHO RV FREE WALL PEAK S': 12.7 CM/S
ECHO RV TAPSE: 1.8 CM (ref 1.7–?)
ECHO TV REGURGITANT MAX VELOCITY: 1.99 M/S
ECHO TV REGURGITANT PEAK GRADIENT: 16 MMHG
EOSINOPHIL # BLD: 0.1 K/UL (ref 0–0.6)
EOSINOPHIL NFR BLD: 1 %
GFR SERPLBLD CREATININE-BSD FMLA CKD-EPI: 56 ML/MIN/{1.73_M2}
GLUCOSE P FAST SERPL-MCNC: 105 MG/DL (ref 70–99)
HCT VFR BLD AUTO: 42.6 % (ref 36–48)
HGB BLD-MCNC: 14.1 G/DL (ref 12–16)
LYMPHOCYTES # BLD: 3.5 K/UL (ref 1–5.1)
LYMPHOCYTES NFR BLD: 36.1 %
MCH RBC QN AUTO: 31.4 PG (ref 26–34)
MCHC RBC AUTO-ENTMCNC: 33 G/DL (ref 31–36)
MCV RBC AUTO: 95.1 FL (ref 80–100)
MONOCYTES # BLD: 0.7 K/UL (ref 0–1.3)
MONOCYTES NFR BLD: 7.4 %
NEUTROPHILS # BLD: 5.3 K/UL (ref 1.7–7.7)
NEUTROPHILS NFR BLD: 55 %
NT-PROBNP SERPL-MCNC: 230 PG/ML (ref 0–124)
PLATELET # BLD AUTO: 242 K/UL (ref 135–450)
PMV BLD AUTO: 8.2 FL (ref 5–10.5)
POTASSIUM SERPL-SCNC: 4.6 MMOL/L (ref 3.5–5.1)
PROT SERPL-MCNC: 7.2 G/DL (ref 6.4–8.2)
RBC # BLD AUTO: 4.48 M/UL (ref 4–5.2)
SODIUM SERPL-SCNC: 138 MMOL/L (ref 136–145)
WBC # BLD AUTO: 9.7 K/UL (ref 4–11)

## 2024-11-12 PROCEDURE — 83880 ASSAY OF NATRIURETIC PEPTIDE: CPT

## 2024-11-12 PROCEDURE — 93306 TTE W/DOPPLER COMPLETE: CPT

## 2024-11-12 PROCEDURE — 36415 COLL VENOUS BLD VENIPUNCTURE: CPT

## 2024-11-12 PROCEDURE — 80053 COMPREHEN METABOLIC PANEL: CPT

## 2024-11-12 PROCEDURE — 85025 COMPLETE CBC W/AUTO DIFF WBC: CPT

## 2024-11-12 PROCEDURE — 80061 LIPID PANEL: CPT

## 2024-11-13 LAB
CHOLEST SERPL-MCNC: 177 MG/DL (ref 0–199)
HDLC SERPL-MCNC: 63 MG/DL (ref 40–60)
LDL CHOLESTEROL: 96 MG/DL
TRIGL SERPL-MCNC: 92 MG/DL (ref 0–150)
VLDLC SERPL CALC-MCNC: 18 MG/DL

## 2025-01-31 NOTE — PROGRESS NOTES
eccentrically directed jet and may underestimate severity. AV PHT is 300.0 ms. No stenosis. Rec: cardiac MRI or [facility] to further evaluate.    Mitral Valve: Mildly calcified leaflets.    Left Atrium: Left atrium is moderately dilated.    Labs were reviewed including labs from other hospital systems through Care Everywhere.  Cardiac testing was reviewed including echos, nuclear scans, cardiac catheterization, including from other hospital systems through Care Everywhere.        Heart Failure Therapies:  The 4 Pillars of Heart Failure Therapies    ACE inhibitors, angiotensin receptor blockers, or ARNI (Entresto): valsartan 320    2.   Beta blockers: coreg 25 bid    3.   Aldosterone blockers:    4.   SGLT2 inhibitors:        Assessment:   1. Systolic CHF, chronic (HCC)    2. Essential hypertension    3. SOB (shortness of breath)    4. Pure hypercholesterolemia    5. Non-ischemic cardiomyopathy (HCC)    6. ICD (implantable cardioverter-defibrillator) in place    7. Chronic pulmonary embolism, unspecified pulmonary embolism type, unspecified whether acute cor pulmonale present (HCC)        Systolic CHF  Echo 11/2024 LVEF 54%  HTN  HLD    Impedance monitoring via Medtronic Optivol Cardiac Compass was downloaded from patient's ICD and reviewed.  The impedance shows stable fluid level.      Plan:  Continue current cardiac medications   Labs fasting now  Follow up in 9 months       This note was scribed in the presence of Moriah Candelaria MD by Madeline Leal RN    The scribe's documentation has been prepared under my direction and personally reviewed by me in its entirety.  I confirm that the note above accurately reflects all work, treatment, procedures, and medical decision making performed by me.        Time Based Itemization  A total of 40 minutes was spent on today's patient encounter.  If applicable, non-patient-facing activities:  ( x)Preparing to see the patient and reviewing records  ( ) Individual interpretation of

## 2025-02-04 ENCOUNTER — OFFICE VISIT (OUTPATIENT)
Dept: CARDIOLOGY CLINIC | Age: 65
End: 2025-02-04
Payer: MEDICARE

## 2025-02-04 VITALS
HEART RATE: 90 BPM | DIASTOLIC BLOOD PRESSURE: 78 MMHG | OXYGEN SATURATION: 97 % | HEIGHT: 64 IN | SYSTOLIC BLOOD PRESSURE: 180 MMHG | BODY MASS INDEX: 34.4 KG/M2 | WEIGHT: 201.5 LBS

## 2025-02-04 DIAGNOSIS — Z95.810 ICD (IMPLANTABLE CARDIOVERTER-DEFIBRILLATOR) IN PLACE: ICD-10-CM

## 2025-02-04 DIAGNOSIS — I10 ESSENTIAL HYPERTENSION: ICD-10-CM

## 2025-02-04 DIAGNOSIS — I42.8 NON-ISCHEMIC CARDIOMYOPATHY (HCC): ICD-10-CM

## 2025-02-04 DIAGNOSIS — I27.82 CHRONIC PULMONARY EMBOLISM, UNSPECIFIED PULMONARY EMBOLISM TYPE, UNSPECIFIED WHETHER ACUTE COR PULMONALE PRESENT (HCC): ICD-10-CM

## 2025-02-04 DIAGNOSIS — I50.22 SYSTOLIC CHF, CHRONIC (HCC): Primary | ICD-10-CM

## 2025-02-04 DIAGNOSIS — E78.00 PURE HYPERCHOLESTEROLEMIA: ICD-10-CM

## 2025-02-04 DIAGNOSIS — R06.02 SOB (SHORTNESS OF BREATH): ICD-10-CM

## 2025-02-04 PROCEDURE — 99215 OFFICE O/P EST HI 40 MIN: CPT | Performed by: INTERNAL MEDICINE

## 2025-02-04 PROCEDURE — 93297 REM INTERROG DEV EVAL ICPMS: CPT | Performed by: INTERNAL MEDICINE

## 2025-02-04 PROCEDURE — 3077F SYST BP >= 140 MM HG: CPT | Performed by: INTERNAL MEDICINE

## 2025-02-04 PROCEDURE — 3078F DIAST BP <80 MM HG: CPT | Performed by: INTERNAL MEDICINE

## 2025-02-04 PROCEDURE — G2211 COMPLEX E/M VISIT ADD ON: HCPCS | Performed by: INTERNAL MEDICINE

## 2025-02-04 RX ORDER — VALSARTAN 320 MG/1
320 TABLET ORAL DAILY
Qty: 90 TABLET | Refills: 3 | Status: SHIPPED | OUTPATIENT
Start: 2025-02-04

## 2025-02-04 RX ORDER — CARVEDILOL 25 MG/1
25 TABLET ORAL 2 TIMES DAILY WITH MEALS
Qty: 180 TABLET | Refills: 3 | Status: SHIPPED | OUTPATIENT
Start: 2025-02-04

## 2025-02-04 RX ORDER — AMLODIPINE BESYLATE 10 MG/1
10 TABLET ORAL DAILY
Qty: 90 TABLET | Refills: 3 | Status: SHIPPED | OUTPATIENT
Start: 2025-02-04

## 2025-02-10 PROCEDURE — 93295 DEV INTERROG REMOTE 1/2/MLT: CPT | Performed by: INTERNAL MEDICINE

## 2025-02-10 PROCEDURE — 93296 REM INTERROG EVL PM/IDS: CPT | Performed by: INTERNAL MEDICINE

## 2025-03-10 ENCOUNTER — TELEPHONE (OUTPATIENT)
Dept: CARDIOLOGY CLINIC | Age: 65
End: 2025-03-10

## 2025-03-10 DIAGNOSIS — I10 PRIMARY HYPERTENSION: Primary | ICD-10-CM

## 2025-03-10 NOTE — TELEPHONE ENCOUNTER
B/P     1) B/P high or low? high    2) When was last B/P reading? 189/90 this AM    3) Did you document readings in the last week? B/P machine keeps track    4) What symptoms are you experiencing? Extreme headaches for about a month and fatigued      HEADACHE/DIZZINESS/SOB/CP     5) How long have you had these symptoms?  1 month  6) Are currently taking B/P medication?      Name:carvedilol (COREG) 25 MG tablet   valsartan (DIOVAN) 320 MG tablet             7) Any medication or dietary changes? no

## 2025-03-11 NOTE — TELEPHONE ENCOUNTER
Spoke with pt. She reports her BP has been 180's/90's since [facility] 2/4/2025. At LOV BP was 188/84. She is having terrible headaches that are not migraines. Pt asking MATEO to increase BP medications.

## 2025-03-13 RX ORDER — TERAZOSIN 2 MG/1
2 CAPSULE ORAL NIGHTLY
Qty: 90 CAPSULE | Refills: 3 | Status: SHIPPED | OUTPATIENT
Start: 2025-03-13

## 2025-03-13 RX ORDER — HYDROCHLOROTHIAZIDE 25 MG/1
25 TABLET ORAL EVERY MORNING
Qty: 90 TABLET | Refills: 3 | Status: SHIPPED | OUTPATIENT
Start: 2025-03-13